# Patient Record
Sex: MALE | Race: WHITE | NOT HISPANIC OR LATINO | Employment: FULL TIME | URBAN - METROPOLITAN AREA
[De-identification: names, ages, dates, MRNs, and addresses within clinical notes are randomized per-mention and may not be internally consistent; named-entity substitution may affect disease eponyms.]

---

## 2019-10-10 ENCOUNTER — OFFICE VISIT (OUTPATIENT)
Dept: FAMILY MEDICINE CLINIC | Facility: CLINIC | Age: 29
End: 2019-10-10

## 2019-10-10 ENCOUNTER — LAB (OUTPATIENT)
Dept: LAB | Facility: HOSPITAL | Age: 29
End: 2019-10-10

## 2019-10-10 VITALS
HEART RATE: 83 BPM | DIASTOLIC BLOOD PRESSURE: 80 MMHG | WEIGHT: 220 LBS | OXYGEN SATURATION: 99 % | SYSTOLIC BLOOD PRESSURE: 120 MMHG | HEIGHT: 71 IN | BODY MASS INDEX: 30.8 KG/M2

## 2019-10-10 DIAGNOSIS — R10.31 RIGHT LOWER QUADRANT ABDOMINAL PAIN: Primary | ICD-10-CM

## 2019-10-10 DIAGNOSIS — R10.31 RIGHT LOWER QUADRANT ABDOMINAL PAIN: ICD-10-CM

## 2019-10-10 PROCEDURE — 99203 OFFICE O/P NEW LOW 30 MIN: CPT | Performed by: FAMILY MEDICINE

## 2019-10-10 PROCEDURE — 85025 COMPLETE CBC W/AUTO DIFF WBC: CPT

## 2019-10-10 PROCEDURE — 80053 COMPREHEN METABOLIC PANEL: CPT

## 2019-10-10 NOTE — PROGRESS NOTES
Subjective   Chetan Gandhi is a 29 y.o. male.     Chief Complaint   Patient presents with   • Establish Care     abdominal pain        History of Present Illness     Acute complaints:  Chetan Gandhi is a 29 y.o. male who presents today to establish care. Happened about a month ago, vomited once, went away. It came back about 2 weeks ago, lasted 4-5 days. No fever or chills or weight loss. No relation to food. Worsens at night after work, bothers him enough to toss and turn in bed. Saw urgent care. Has not had the pain again since that time. Bowel movements have been abnormal. Less solid than usual.    This patient is accompanied by their self who contributes to the history of their care.    The following portions of the patient's history were reviewed and updated as appropriate: allergies, current medications, past family history, past medical history, past social history, past surgical history and problem list.    Active Ambulatory Problems     Diagnosis Date Noted   • No Active Ambulatory Problems     Resolved Ambulatory Problems     Diagnosis Date Noted   • No Resolved Ambulatory Problems     No Additional Past Medical History     History reviewed. No pertinent surgical history.  Family History   Family history unknown: Yes     Social History     Socioeconomic History   • Marital status:      Spouse name: Not on file   • Number of children: Not on file   • Years of education: Not on file   • Highest education level: Not on file   Tobacco Use   • Smoking status: Never Smoker   • Smokeless tobacco: Never Used   Substance and Sexual Activity   • Alcohol use: No     Frequency: Never   • Drug use: No   • Sexual activity: Defer         Review of Systems   Constitutional: Negative.    Respiratory: Negative.    Cardiovascular: Negative.    Gastrointestinal: Positive for abdominal pain and diarrhea. Negative for abdominal distention, constipation, nausea, rectal pain and vomiting.       Objective  "  Blood pressure 120/80, pulse 83, height 180.3 cm (70.98\"), weight 99.8 kg (220 lb), SpO2 99 %.  Nursing note reviewed  Physical Exam  Const: NAD, A&Ox4, Pleasant, Cooperative  Eyes: EOMI, no conjunctivitis  ENT: No nasal discharge present, neck supple  Cardiac: Regular rate and rhythm, no cyanosis  Resp: Respiratory rate within normal limits, no increased work of breathing, no audible wheezing or retractions noted  GI: No distention or ascites. Mild TTP RLQ  Procedures  Assessment/Plan   Problem List Items Addressed This Visit     None      Visit Diagnoses     Right lower quadrant abdominal pain    -  Primary    Relevant Orders    CT Abdomen Pelvis With & Without Contrast    CBC & Differential    Comprehensive Metabolic Panel          We will plan to obtain previous records both for chronic preventative care as well as those related to the current episode of care.  Any records that the patient brought with him today were reviewed personally by me during the visit today and will be scanned into the chart for posterity.    There are no Patient Instructions on file for this visit.    No Follow-up on file.    Ambulatory progress note signed and attested to by Juwan Bryson D.O.           "

## 2019-10-11 LAB
ALBUMIN SERPL-MCNC: 4.7 G/DL (ref 3.5–5.2)
ALBUMIN/GLOB SERPL: 1.4 G/DL
ALP SERPL-CCNC: 89 U/L (ref 39–117)
ALT SERPL W P-5'-P-CCNC: 43 U/L (ref 1–41)
ANION GAP SERPL CALCULATED.3IONS-SCNC: 11.6 MMOL/L (ref 5–15)
AST SERPL-CCNC: 24 U/L (ref 1–40)
BASOPHILS # BLD AUTO: 0.07 10*3/MM3 (ref 0–0.2)
BASOPHILS NFR BLD AUTO: 1 % (ref 0–1.5)
BILIRUB SERPL-MCNC: 0.3 MG/DL (ref 0.2–1.2)
BUN BLD-MCNC: 13 MG/DL (ref 6–20)
BUN/CREAT SERPL: 9.8 (ref 7–25)
CALCIUM SPEC-SCNC: 9.7 MG/DL (ref 8.6–10.5)
CHLORIDE SERPL-SCNC: 99 MMOL/L (ref 98–107)
CO2 SERPL-SCNC: 27.4 MMOL/L (ref 22–29)
CREAT BLD-MCNC: 1.33 MG/DL (ref 0.76–1.27)
DEPRECATED RDW RBC AUTO: 36.5 FL (ref 37–54)
EOSINOPHIL # BLD AUTO: 0.46 10*3/MM3 (ref 0–0.4)
EOSINOPHIL NFR BLD AUTO: 6.4 % (ref 0.3–6.2)
ERYTHROCYTE [DISTWIDTH] IN BLOOD BY AUTOMATED COUNT: 13 % (ref 12.3–15.4)
GFR SERPL CREATININE-BSD FRML MDRD: 64 ML/MIN/1.73
GLOBULIN UR ELPH-MCNC: 3.4 GM/DL
GLUCOSE BLD-MCNC: 78 MG/DL (ref 65–99)
HCT VFR BLD AUTO: 43.8 % (ref 37.5–51)
HGB BLD-MCNC: 14.5 G/DL (ref 13–17.7)
IMM GRANULOCYTES # BLD AUTO: 0.09 10*3/MM3 (ref 0–0.05)
IMM GRANULOCYTES NFR BLD AUTO: 1.3 % (ref 0–0.5)
LYMPHOCYTES # BLD AUTO: 1.88 10*3/MM3 (ref 0.7–3.1)
LYMPHOCYTES NFR BLD AUTO: 26.3 % (ref 19.6–45.3)
MCH RBC QN AUTO: 26.5 PG (ref 26.6–33)
MCHC RBC AUTO-ENTMCNC: 33.1 G/DL (ref 31.5–35.7)
MCV RBC AUTO: 79.9 FL (ref 79–97)
MONOCYTES # BLD AUTO: 0.51 10*3/MM3 (ref 0.1–0.9)
MONOCYTES NFR BLD AUTO: 7.1 % (ref 5–12)
NEUTROPHILS # BLD AUTO: 4.13 10*3/MM3 (ref 1.7–7)
NEUTROPHILS NFR BLD AUTO: 57.9 % (ref 42.7–76)
NRBC BLD AUTO-RTO: 0 /100 WBC (ref 0–0.2)
PLATELET # BLD AUTO: 367 10*3/MM3 (ref 140–450)
PMV BLD AUTO: 11.8 FL (ref 6–12)
POTASSIUM BLD-SCNC: 4 MMOL/L (ref 3.5–5.2)
PROT SERPL-MCNC: 8.1 G/DL (ref 6–8.5)
RBC # BLD AUTO: 5.48 10*6/MM3 (ref 4.14–5.8)
SODIUM BLD-SCNC: 138 MMOL/L (ref 136–145)
WBC NRBC COR # BLD: 7.14 10*3/MM3 (ref 3.4–10.8)

## 2019-11-26 ENCOUNTER — HOSPITAL ENCOUNTER (INPATIENT)
Facility: HOSPITAL | Age: 29
LOS: 1 days | Discharge: HOME | End: 2019-11-28
Attending: EMERGENCY MEDICINE | Admitting: INTERNAL MEDICINE

## 2019-11-26 ENCOUNTER — APPOINTMENT (OUTPATIENT)
Dept: CT IMAGING | Facility: HOSPITAL | Age: 29
End: 2019-11-26

## 2019-11-26 DIAGNOSIS — N17.9 AKI (ACUTE KIDNEY INJURY) (HCC): ICD-10-CM

## 2019-11-26 DIAGNOSIS — R10.31 RIGHT LOWER QUADRANT ABDOMINAL PAIN: Primary | ICD-10-CM

## 2019-11-26 LAB
ALBUMIN SERPL-MCNC: 4.3 G/DL (ref 3.5–5.2)
ALBUMIN/GLOB SERPL: 1.1 G/DL
ALP SERPL-CCNC: 110 U/L (ref 39–117)
ALT SERPL W P-5'-P-CCNC: 32 U/L (ref 1–41)
ANION GAP SERPL CALCULATED.3IONS-SCNC: 14 MMOL/L (ref 5–15)
AST SERPL-CCNC: 18 U/L (ref 1–40)
BASOPHILS # BLD AUTO: 0.07 10*3/MM3 (ref 0–0.2)
BASOPHILS NFR BLD AUTO: 0.6 % (ref 0–1.5)
BILIRUB SERPL-MCNC: 0.6 MG/DL (ref 0.2–1.2)
BUN BLD-MCNC: 16 MG/DL (ref 6–20)
BUN/CREAT SERPL: 11.9 (ref 7–25)
CALCIUM SPEC-SCNC: 9.8 MG/DL (ref 8.6–10.5)
CHLORIDE SERPL-SCNC: 98 MMOL/L (ref 98–107)
CO2 SERPL-SCNC: 24 MMOL/L (ref 22–29)
CREAT BLD-MCNC: 1.35 MG/DL (ref 0.76–1.27)
D-LACTATE SERPL-SCNC: 0.6 MMOL/L (ref 0.5–2)
DEPRECATED RDW RBC AUTO: 40.4 FL (ref 37–54)
EOSINOPHIL # BLD AUTO: 0.14 10*3/MM3 (ref 0–0.4)
EOSINOPHIL NFR BLD AUTO: 1.2 % (ref 0.3–6.2)
ERYTHROCYTE [DISTWIDTH] IN BLOOD BY AUTOMATED COUNT: 13.2 % (ref 12.3–15.4)
GFR SERPL CREATININE-BSD FRML MDRD: 63 ML/MIN/1.73
GLOBULIN UR ELPH-MCNC: 3.9 GM/DL
GLUCOSE BLD-MCNC: 109 MG/DL (ref 65–99)
HCT VFR BLD AUTO: 42.4 % (ref 37.5–51)
HGB BLD-MCNC: 13.4 G/DL (ref 13–17.7)
HOLD SPECIMEN: NORMAL
HOLD SPECIMEN: NORMAL
IMM GRANULOCYTES # BLD AUTO: 0.11 10*3/MM3 (ref 0–0.05)
IMM GRANULOCYTES NFR BLD AUTO: 0.9 % (ref 0–0.5)
LIPASE SERPL-CCNC: 22 U/L (ref 13–60)
LYMPHOCYTES # BLD AUTO: 1.56 10*3/MM3 (ref 0.7–3.1)
LYMPHOCYTES NFR BLD AUTO: 12.9 % (ref 19.6–45.3)
MCH RBC QN AUTO: 26.4 PG (ref 26.6–33)
MCHC RBC AUTO-ENTMCNC: 31.6 G/DL (ref 31.5–35.7)
MCV RBC AUTO: 83.5 FL (ref 79–97)
MONOCYTES # BLD AUTO: 1.1 10*3/MM3 (ref 0.1–0.9)
MONOCYTES NFR BLD AUTO: 9.1 % (ref 5–12)
NEUTROPHILS # BLD AUTO: 9.13 10*3/MM3 (ref 1.7–7)
NEUTROPHILS NFR BLD AUTO: 75.3 % (ref 42.7–76)
NRBC BLD AUTO-RTO: 0 /100 WBC (ref 0–0.2)
PLATELET # BLD AUTO: 303 10*3/MM3 (ref 140–450)
PMV BLD AUTO: 11.3 FL (ref 6–12)
POTASSIUM BLD-SCNC: 4.2 MMOL/L (ref 3.5–5.2)
PROT SERPL-MCNC: 8.2 G/DL (ref 6–8.5)
RBC # BLD AUTO: 5.08 10*6/MM3 (ref 4.14–5.8)
SODIUM BLD-SCNC: 136 MMOL/L (ref 136–145)
WBC NRBC COR # BLD: 12.11 10*3/MM3 (ref 3.4–10.8)
WHOLE BLOOD HOLD SPECIMEN: NORMAL
WHOLE BLOOD HOLD SPECIMEN: NORMAL

## 2019-11-26 PROCEDURE — 74176 CT ABD & PELVIS W/O CONTRAST: CPT

## 2019-11-26 PROCEDURE — 85025 COMPLETE CBC W/AUTO DIFF WBC: CPT

## 2019-11-26 PROCEDURE — 99284 EMERGENCY DEPT VISIT MOD MDM: CPT

## 2019-11-26 PROCEDURE — 83605 ASSAY OF LACTIC ACID: CPT

## 2019-11-26 PROCEDURE — 83690 ASSAY OF LIPASE: CPT

## 2019-11-26 PROCEDURE — 80053 COMPREHEN METABOLIC PANEL: CPT

## 2019-11-26 RX ORDER — ONDANSETRON 2 MG/ML
4 INJECTION INTRAMUSCULAR; INTRAVENOUS ONCE
Status: COMPLETED | OUTPATIENT
Start: 2019-11-26 | End: 2019-11-27

## 2019-11-26 RX ORDER — SODIUM CHLORIDE 0.9 % (FLUSH) 0.9 %
10 SYRINGE (ML) INJECTION AS NEEDED
Status: DISCONTINUED | OUTPATIENT
Start: 2019-11-26 | End: 2019-11-28 | Stop reason: HOSPADM

## 2019-11-26 RX ORDER — MORPHINE SULFATE 4 MG/ML
4 INJECTION, SOLUTION INTRAMUSCULAR; INTRAVENOUS ONCE
Status: COMPLETED | OUTPATIENT
Start: 2019-11-26 | End: 2019-11-27

## 2019-11-27 PROBLEM — N28.9 RENAL INSUFFICIENCY: Status: ACTIVE | Noted: 2019-11-27

## 2019-11-27 PROBLEM — R10.31 RIGHT LOWER QUADRANT ABDOMINAL PAIN: Status: ACTIVE | Noted: 2019-11-27

## 2019-11-27 PROBLEM — K35.80 ACUTE APPENDICITIS: Status: ACTIVE | Noted: 2019-11-27

## 2019-11-27 PROBLEM — K36 CHRONIC APPENDICITIS: Status: ACTIVE | Noted: 2019-11-27

## 2019-11-27 PROBLEM — K35.32 RUPTURED APPENDICITIS: Status: ACTIVE | Noted: 2019-11-27

## 2019-11-27 LAB
ANION GAP SERPL CALCULATED.3IONS-SCNC: 11 MMOL/L (ref 5–15)
BASOPHILS # BLD AUTO: 0.06 10*3/MM3 (ref 0–0.2)
BASOPHILS NFR BLD AUTO: 0.5 % (ref 0–1.5)
BILIRUB UR QL STRIP: NEGATIVE
BUN BLD-MCNC: 15 MG/DL (ref 6–20)
BUN/CREAT SERPL: 10.1 (ref 7–25)
CALCIUM SPEC-SCNC: 9 MG/DL (ref 8.6–10.5)
CHLORIDE SERPL-SCNC: 100 MMOL/L (ref 98–107)
CLARITY UR: CLEAR
CO2 SERPL-SCNC: 26 MMOL/L (ref 22–29)
COLOR UR: YELLOW
CREAT BLD-MCNC: 1.48 MG/DL (ref 0.76–1.27)
CREAT UR-MCNC: 148.3 MG/DL
DEPRECATED RDW RBC AUTO: 41.1 FL (ref 37–54)
EOSINOPHIL # BLD AUTO: 0.13 10*3/MM3 (ref 0–0.4)
EOSINOPHIL NFR BLD AUTO: 1.2 % (ref 0.3–6.2)
ERYTHROCYTE [DISTWIDTH] IN BLOOD BY AUTOMATED COUNT: 13.4 % (ref 12.3–15.4)
FERRITIN SERPL-MCNC: 189.3 NG/ML (ref 30–400)
GFR SERPL CREATININE-BSD FRML MDRD: 57 ML/MIN/1.73
GLUCOSE BLD-MCNC: 109 MG/DL (ref 65–99)
GLUCOSE UR STRIP-MCNC: NEGATIVE MG/DL
HCT VFR BLD AUTO: 38.8 % (ref 37.5–51)
HGB BLD-MCNC: 12 G/DL (ref 13–17.7)
HGB UR QL STRIP.AUTO: NEGATIVE
IMM GRANULOCYTES # BLD AUTO: 0.07 10*3/MM3 (ref 0–0.05)
IMM GRANULOCYTES NFR BLD AUTO: 0.6 % (ref 0–0.5)
IRON 24H UR-MRATE: 25 MCG/DL (ref 59–158)
IRON SATN MFR SERPL: 10 % (ref 20–50)
KETONES UR QL STRIP: NEGATIVE
LEUKOCYTE ESTERASE UR QL STRIP.AUTO: NEGATIVE
LYMPHOCYTES # BLD AUTO: 1.99 10*3/MM3 (ref 0.7–3.1)
LYMPHOCYTES NFR BLD AUTO: 18.1 % (ref 19.6–45.3)
MCH RBC QN AUTO: 25.9 PG (ref 26.6–33)
MCHC RBC AUTO-ENTMCNC: 30.9 G/DL (ref 31.5–35.7)
MCV RBC AUTO: 83.8 FL (ref 79–97)
MONOCYTES # BLD AUTO: 1.1 10*3/MM3 (ref 0.1–0.9)
MONOCYTES NFR BLD AUTO: 10 % (ref 5–12)
NEUTROPHILS # BLD AUTO: 7.64 10*3/MM3 (ref 1.7–7)
NEUTROPHILS NFR BLD AUTO: 69.6 % (ref 42.7–76)
NITRITE UR QL STRIP: NEGATIVE
NRBC BLD AUTO-RTO: 0 /100 WBC (ref 0–0.2)
PH UR STRIP.AUTO: 7 [PH] (ref 5–8)
PLATELET # BLD AUTO: 264 10*3/MM3 (ref 140–450)
PMV BLD AUTO: 11.2 FL (ref 6–12)
POTASSIUM BLD-SCNC: 4.4 MMOL/L (ref 3.5–5.2)
PROT UR QL STRIP: NEGATIVE
RBC # BLD AUTO: 4.63 10*6/MM3 (ref 4.14–5.8)
SODIUM BLD-SCNC: 137 MMOL/L (ref 136–145)
SP GR UR STRIP: 1.01 (ref 1–1.03)
TIBC SERPL-MCNC: 259 MCG/DL (ref 298–536)
TRANSFERRIN SERPL-MCNC: 174 MG/DL (ref 200–360)
UROBILINOGEN UR QL STRIP: NORMAL
WBC NRBC COR # BLD: 10.99 10*3/MM3 (ref 3.4–10.8)

## 2019-11-27 PROCEDURE — 25010000002 ONDANSETRON PER 1 MG: Performed by: EMERGENCY MEDICINE

## 2019-11-27 PROCEDURE — 82728 ASSAY OF FERRITIN: CPT | Performed by: INTERNAL MEDICINE

## 2019-11-27 PROCEDURE — 82570 ASSAY OF URINE CREATININE: CPT | Performed by: NURSE PRACTITIONER

## 2019-11-27 PROCEDURE — 25010000002 MORPHINE PER 10 MG: Performed by: INTERNAL MEDICINE

## 2019-11-27 PROCEDURE — 80048 BASIC METABOLIC PNL TOTAL CA: CPT | Performed by: NURSE PRACTITIONER

## 2019-11-27 PROCEDURE — 81003 URINALYSIS AUTO W/O SCOPE: CPT | Performed by: NURSE PRACTITIONER

## 2019-11-27 PROCEDURE — 25010000002 PIPERACILLIN SOD-TAZOBACTAM PER 1 G: Performed by: NURSE PRACTITIONER

## 2019-11-27 PROCEDURE — 85025 COMPLETE CBC W/AUTO DIFF WBC: CPT | Performed by: NURSE PRACTITIONER

## 2019-11-27 PROCEDURE — 99223 1ST HOSP IP/OBS HIGH 75: CPT | Performed by: INTERNAL MEDICINE

## 2019-11-27 PROCEDURE — 25010000002 ERTAPENEM PER 500 MG: Performed by: INTERNAL MEDICINE

## 2019-11-27 PROCEDURE — 25010000002 PIPERACILLIN SOD-TAZOBACTAM PER 1 G: Performed by: INTERNAL MEDICINE

## 2019-11-27 PROCEDURE — 87040 BLOOD CULTURE FOR BACTERIA: CPT | Performed by: EMERGENCY MEDICINE

## 2019-11-27 PROCEDURE — 25010000002 MORPHINE PER 10 MG: Performed by: EMERGENCY MEDICINE

## 2019-11-27 PROCEDURE — 84466 ASSAY OF TRANSFERRIN: CPT | Performed by: INTERNAL MEDICINE

## 2019-11-27 PROCEDURE — 83540 ASSAY OF IRON: CPT | Performed by: INTERNAL MEDICINE

## 2019-11-27 RX ORDER — SODIUM CHLORIDE, SODIUM LACTATE, POTASSIUM CHLORIDE, CALCIUM CHLORIDE 600; 310; 30; 20 MG/100ML; MG/100ML; MG/100ML; MG/100ML
125 INJECTION, SOLUTION INTRAVENOUS CONTINUOUS
Status: ACTIVE | OUTPATIENT
Start: 2019-11-27 | End: 2019-11-28

## 2019-11-27 RX ORDER — MORPHINE SULFATE 4 MG/ML
4 INJECTION, SOLUTION INTRAMUSCULAR; INTRAVENOUS
Status: DISCONTINUED | OUTPATIENT
Start: 2019-11-27 | End: 2019-11-28 | Stop reason: HOSPADM

## 2019-11-27 RX ORDER — NALOXONE HCL 0.4 MG/ML
0.4 VIAL (ML) INJECTION
Status: DISCONTINUED | OUTPATIENT
Start: 2019-11-27 | End: 2019-11-28 | Stop reason: HOSPADM

## 2019-11-27 RX ORDER — ONDANSETRON 2 MG/ML
4 INJECTION INTRAMUSCULAR; INTRAVENOUS EVERY 6 HOURS PRN
Status: DISCONTINUED | OUTPATIENT
Start: 2019-11-27 | End: 2019-11-28 | Stop reason: HOSPADM

## 2019-11-27 RX ORDER — SODIUM CHLORIDE 0.9 % (FLUSH) 0.9 %
10 SYRINGE (ML) INJECTION EVERY 12 HOURS SCHEDULED
Status: DISCONTINUED | OUTPATIENT
Start: 2019-11-27 | End: 2019-11-28 | Stop reason: HOSPADM

## 2019-11-27 RX ORDER — HYDROCODONE BITARTRATE AND ACETAMINOPHEN 5; 325 MG/1; MG/1
1 TABLET ORAL EVERY 4 HOURS PRN
Status: DISCONTINUED | OUTPATIENT
Start: 2019-11-27 | End: 2019-11-28 | Stop reason: HOSPADM

## 2019-11-27 RX ORDER — ACETAMINOPHEN 325 MG/1
650 TABLET ORAL EVERY 6 HOURS PRN
Status: DISCONTINUED | OUTPATIENT
Start: 2019-11-27 | End: 2019-11-28 | Stop reason: HOSPADM

## 2019-11-27 RX ORDER — SODIUM CHLORIDE 0.9 % (FLUSH) 0.9 %
10 SYRINGE (ML) INJECTION AS NEEDED
Status: DISCONTINUED | OUTPATIENT
Start: 2019-11-27 | End: 2019-11-28 | Stop reason: HOSPADM

## 2019-11-27 RX ADMIN — SODIUM CHLORIDE, POTASSIUM CHLORIDE, SODIUM LACTATE AND CALCIUM CHLORIDE 125 ML/HR: 600; 310; 30; 20 INJECTION, SOLUTION INTRAVENOUS at 13:27

## 2019-11-27 RX ADMIN — SODIUM CHLORIDE, POTASSIUM CHLORIDE, SODIUM LACTATE AND CALCIUM CHLORIDE 125 ML/HR: 600; 310; 30; 20 INJECTION, SOLUTION INTRAVENOUS at 03:29

## 2019-11-27 RX ADMIN — HYDROCODONE BITARTRATE AND ACETAMINOPHEN 1 TABLET: 5; 325 TABLET ORAL at 19:49

## 2019-11-27 RX ADMIN — ACETAMINOPHEN 650 MG: 325 TABLET ORAL at 21:22

## 2019-11-27 RX ADMIN — ONDANSETRON 4 MG: 2 INJECTION INTRAMUSCULAR; INTRAVENOUS at 00:04

## 2019-11-27 RX ADMIN — ERTAPENEM SODIUM 1 G: 1 INJECTION, POWDER, LYOPHILIZED, FOR SOLUTION INTRAMUSCULAR; INTRAVENOUS at 13:10

## 2019-11-27 RX ADMIN — TAZOBACTAM SODIUM AND PIPERACILLIN SODIUM 3.38 G: 375; 3 INJECTION, SOLUTION INTRAVENOUS at 08:26

## 2019-11-27 RX ADMIN — SODIUM CHLORIDE, PRESERVATIVE FREE 10 ML: 5 INJECTION INTRAVENOUS at 21:23

## 2019-11-27 RX ADMIN — HYDROCODONE BITARTRATE AND ACETAMINOPHEN 1 TABLET: 5; 325 TABLET ORAL at 13:26

## 2019-11-27 RX ADMIN — MORPHINE SULFATE 4 MG: 4 INJECTION, SOLUTION INTRAMUSCULAR; INTRAVENOUS at 00:06

## 2019-11-27 RX ADMIN — TAZOBACTAM SODIUM AND PIPERACILLIN SODIUM 4.5 G: 500; 4 INJECTION, SOLUTION INTRAVENOUS at 01:50

## 2019-11-27 RX ADMIN — MORPHINE SULFATE 4 MG: 4 INJECTION, SOLUTION INTRAMUSCULAR; INTRAVENOUS at 05:01

## 2019-11-27 RX ADMIN — SODIUM CHLORIDE 1000 ML: 9 INJECTION, SOLUTION INTRAVENOUS at 00:04

## 2019-11-27 RX ADMIN — MORPHINE SULFATE 4 MG: 4 INJECTION, SOLUTION INTRAMUSCULAR; INTRAVENOUS at 08:26

## 2019-11-28 VITALS
DIASTOLIC BLOOD PRESSURE: 55 MMHG | TEMPERATURE: 98.9 F | HEART RATE: 81 BPM | RESPIRATION RATE: 16 BRPM | BODY MASS INDEX: 30.76 KG/M2 | WEIGHT: 219.7 LBS | SYSTOLIC BLOOD PRESSURE: 115 MMHG | OXYGEN SATURATION: 94 % | HEIGHT: 71 IN

## 2019-11-28 LAB
ANION GAP SERPL CALCULATED.3IONS-SCNC: 11 MMOL/L (ref 5–15)
BUN BLD-MCNC: 10 MG/DL (ref 6–20)
BUN/CREAT SERPL: 9.6 (ref 7–25)
CALCIUM SPEC-SCNC: 8.9 MG/DL (ref 8.6–10.5)
CHLORIDE SERPL-SCNC: 100 MMOL/L (ref 98–107)
CO2 SERPL-SCNC: 23 MMOL/L (ref 22–29)
CREAT BLD-MCNC: 1.04 MG/DL (ref 0.76–1.27)
GFR SERPL CREATININE-BSD FRML MDRD: 85 ML/MIN/1.73
GLUCOSE BLD-MCNC: 105 MG/DL (ref 65–99)
POTASSIUM BLD-SCNC: 4.2 MMOL/L (ref 3.5–5.2)
SODIUM BLD-SCNC: 134 MMOL/L (ref 136–145)

## 2019-11-28 PROCEDURE — 25010000002 ERTAPENEM PER 500 MG: Performed by: INTERNAL MEDICINE

## 2019-11-28 PROCEDURE — 99239 HOSP IP/OBS DSCHRG MGMT >30: CPT | Performed by: INTERNAL MEDICINE

## 2019-11-28 PROCEDURE — 80048 BASIC METABOLIC PNL TOTAL CA: CPT | Performed by: INTERNAL MEDICINE

## 2019-11-28 RX ORDER — HYDROCODONE BITARTRATE AND ACETAMINOPHEN 5; 325 MG/1; MG/1
1 TABLET ORAL EVERY 4 HOURS PRN
Qty: 20 TABLET | Refills: 0 | Status: SHIPPED | OUTPATIENT
Start: 2019-11-28 | End: 2019-12-07

## 2019-11-28 RX ORDER — ONDANSETRON 4 MG/1
4 TABLET, FILM COATED ORAL EVERY 8 HOURS PRN
Qty: 20 TABLET | Refills: 0 | Status: SHIPPED | OUTPATIENT
Start: 2019-11-28 | End: 2020-08-11

## 2019-11-28 RX ORDER — DOCUSATE SODIUM 100 MG/1
100 CAPSULE, LIQUID FILLED ORAL 2 TIMES DAILY
Qty: 30 CAPSULE | Refills: 0 | Status: SHIPPED | OUTPATIENT
Start: 2019-11-28 | End: 2020-08-11

## 2019-11-28 RX ADMIN — HYDROCODONE BITARTRATE AND ACETAMINOPHEN 1 TABLET: 5; 325 TABLET ORAL at 10:52

## 2019-11-28 RX ADMIN — ERTAPENEM SODIUM 1 G: 1 INJECTION, POWDER, LYOPHILIZED, FOR SOLUTION INTRAMUSCULAR; INTRAVENOUS at 08:52

## 2019-11-28 RX ADMIN — HYDROCODONE BITARTRATE AND ACETAMINOPHEN 1 TABLET: 5; 325 TABLET ORAL at 06:34

## 2019-12-02 ENCOUNTER — LAB (OUTPATIENT)
Dept: LAB | Facility: HOSPITAL | Age: 29
End: 2019-12-02

## 2019-12-02 ENCOUNTER — TRANSITIONAL CARE MANAGEMENT TELEPHONE ENCOUNTER (OUTPATIENT)
Dept: INTERNAL MEDICINE | Facility: CLINIC | Age: 29
End: 2019-12-02

## 2019-12-02 ENCOUNTER — TRANSCRIBE ORDERS (OUTPATIENT)
Dept: LAB | Facility: HOSPITAL | Age: 29
End: 2019-12-02

## 2019-12-02 DIAGNOSIS — K35.32 RUPTURE OF APPENDIX: Primary | ICD-10-CM

## 2019-12-02 DIAGNOSIS — K35.32 RUPTURE OF APPENDIX: ICD-10-CM

## 2019-12-02 LAB
ALBUMIN SERPL-MCNC: 4.1 G/DL (ref 3.5–5.2)
ALBUMIN/GLOB SERPL: 0.9 G/DL
ALP SERPL-CCNC: 102 U/L (ref 39–117)
ALT SERPL W P-5'-P-CCNC: 36 U/L (ref 1–41)
ANION GAP SERPL CALCULATED.3IONS-SCNC: 11 MMOL/L (ref 5–15)
AST SERPL-CCNC: 34 U/L (ref 1–40)
BACTERIA SPEC AEROBE CULT: NORMAL
BACTERIA SPEC AEROBE CULT: NORMAL
BASOPHILS # BLD AUTO: 0.05 10*3/MM3 (ref 0–0.2)
BASOPHILS NFR BLD AUTO: 0.6 % (ref 0–1.5)
BILIRUB SERPL-MCNC: 0.3 MG/DL (ref 0.2–1.2)
BUN BLD-MCNC: 15 MG/DL (ref 6–20)
BUN/CREAT SERPL: 12 (ref 7–25)
CALCIUM SPEC-SCNC: 10.1 MG/DL (ref 8.6–10.5)
CHLORIDE SERPL-SCNC: 98 MMOL/L (ref 98–107)
CO2 SERPL-SCNC: 27 MMOL/L (ref 22–29)
CREAT BLD-MCNC: 1.25 MG/DL (ref 0.76–1.27)
CRP SERPL-MCNC: 7.09 MG/DL (ref 0–0.5)
DEPRECATED RDW RBC AUTO: 39.1 FL (ref 37–54)
EOSINOPHIL # BLD AUTO: 0.28 10*3/MM3 (ref 0–0.4)
EOSINOPHIL NFR BLD AUTO: 3.3 % (ref 0.3–6.2)
ERYTHROCYTE [DISTWIDTH] IN BLOOD BY AUTOMATED COUNT: 13 % (ref 12.3–15.4)
ERYTHROCYTE [SEDIMENTATION RATE] IN BLOOD: 81 MM/HR (ref 0–15)
GFR SERPL CREATININE-BSD FRML MDRD: 69 ML/MIN/1.73
GLOBULIN UR ELPH-MCNC: 4.6 GM/DL
GLUCOSE BLD-MCNC: 107 MG/DL (ref 65–99)
HCT VFR BLD AUTO: 45.2 % (ref 37.5–51)
HGB BLD-MCNC: 14 G/DL (ref 13–17.7)
IMM GRANULOCYTES # BLD AUTO: 0.03 10*3/MM3 (ref 0–0.05)
IMM GRANULOCYTES NFR BLD AUTO: 0.4 % (ref 0–0.5)
LYMPHOCYTES # BLD AUTO: 1.3 10*3/MM3 (ref 0.7–3.1)
LYMPHOCYTES NFR BLD AUTO: 15.5 % (ref 19.6–45.3)
MCH RBC QN AUTO: 25.8 PG (ref 26.6–33)
MCHC RBC AUTO-ENTMCNC: 31 G/DL (ref 31.5–35.7)
MCV RBC AUTO: 83.4 FL (ref 79–97)
MONOCYTES # BLD AUTO: 0.5 10*3/MM3 (ref 0.1–0.9)
MONOCYTES NFR BLD AUTO: 6 % (ref 5–12)
NEUTROPHILS # BLD AUTO: 6.23 10*3/MM3 (ref 1.7–7)
NEUTROPHILS NFR BLD AUTO: 74.2 % (ref 42.7–76)
NRBC BLD AUTO-RTO: 0 /100 WBC (ref 0–0.2)
PLATELET # BLD AUTO: 373 10*3/MM3 (ref 140–450)
PMV BLD AUTO: 10.8 FL (ref 6–12)
POTASSIUM BLD-SCNC: 4.7 MMOL/L (ref 3.5–5.2)
PROT SERPL-MCNC: 8.7 G/DL (ref 6–8.5)
RBC # BLD AUTO: 5.42 10*6/MM3 (ref 4.14–5.8)
SODIUM BLD-SCNC: 136 MMOL/L (ref 136–145)
WBC NRBC COR # BLD: 8.39 10*3/MM3 (ref 3.4–10.8)

## 2019-12-02 PROCEDURE — 80053 COMPREHEN METABOLIC PANEL: CPT

## 2019-12-02 PROCEDURE — 36415 COLL VENOUS BLD VENIPUNCTURE: CPT

## 2019-12-02 PROCEDURE — 85652 RBC SED RATE AUTOMATED: CPT

## 2019-12-02 PROCEDURE — 85025 COMPLETE CBC W/AUTO DIFF WBC: CPT

## 2019-12-02 PROCEDURE — 86140 C-REACTIVE PROTEIN: CPT

## 2019-12-02 NOTE — OUTREACH NOTE
"TCM call completed.  See TCM flowsheet for details.  Does NOT have upcoming hospital follow up with PCP.  Is up and about and states was needing pain medicine until today and pain is \"good today.\"  Has not required any norco today.  Is getting IV infusions and will see Dr. Mai (ID) tomorrow.  Eating and drinking.  States stools are loose.  Discussed backing off on docusate, but he will discuss tomorrow.  Denies fever, chills, SOB, lightheadedness/dizziness, rapid heart rate/palpitations, or chest pain.  He would like to wait for appointment with Dr. Bryson until after all his other appointments and to see what exactly he needs to have done.  Denies any needs at present and appreciated the call.     "

## 2019-12-05 ENCOUNTER — TRANSCRIBE ORDERS (OUTPATIENT)
Dept: ADMINISTRATIVE | Facility: HOSPITAL | Age: 29
End: 2019-12-05

## 2019-12-05 DIAGNOSIS — K35.32 APPENDICITIS WITH PERFORATION: Primary | ICD-10-CM

## 2019-12-13 ENCOUNTER — HOSPITAL ENCOUNTER (OUTPATIENT)
Dept: CT IMAGING | Facility: HOSPITAL | Age: 29
Discharge: HOME | End: 2019-12-13
Admitting: INTERNAL MEDICINE

## 2019-12-13 DIAGNOSIS — K35.32 APPENDICITIS WITH PERFORATION: ICD-10-CM

## 2019-12-13 PROCEDURE — 25010000002 IOPAMIDOL 61 % SOLUTION: Performed by: INTERNAL MEDICINE

## 2019-12-13 PROCEDURE — 0 DIATRIZOATE MEGLUMINE & SODIUM PER 1 ML: Performed by: INTERNAL MEDICINE

## 2019-12-13 PROCEDURE — 74177 CT ABD & PELVIS W/CONTRAST: CPT

## 2019-12-13 RX ADMIN — DIATRIZOATE MEGLUMINE AND DIATRIZOATE SODIUM 15 ML: 660; 100 LIQUID ORAL; RECTAL at 11:53

## 2019-12-13 RX ADMIN — IOPAMIDOL 85 ML: 612 INJECTION, SOLUTION INTRAVENOUS at 11:53

## 2019-12-17 ENCOUNTER — TRANSCRIBE ORDERS (OUTPATIENT)
Dept: LAB | Facility: HOSPITAL | Age: 29
End: 2019-12-17

## 2019-12-17 ENCOUNTER — LAB (OUTPATIENT)
Dept: LAB | Facility: HOSPITAL | Age: 29
End: 2019-12-17

## 2019-12-17 DIAGNOSIS — K35.32 RUPTURE OF APPENDIX: Primary | ICD-10-CM

## 2019-12-17 DIAGNOSIS — D72.823 NEUTROPHILIC LEUKEMOID REACTION: ICD-10-CM

## 2019-12-17 DIAGNOSIS — K35.32 RUPTURE OF APPENDIX: ICD-10-CM

## 2019-12-17 DIAGNOSIS — N17.8 ACUTE RENAL FAILURE WITH PATHOLOGICAL LESION IN KIDNEY (HCC): ICD-10-CM

## 2019-12-17 LAB
ALBUMIN SERPL-MCNC: 4.3 G/DL (ref 3.5–5.2)
ALBUMIN/GLOB SERPL: 1.3 G/DL
ALP SERPL-CCNC: 80 U/L (ref 39–117)
ALT SERPL W P-5'-P-CCNC: 65 U/L (ref 1–41)
ANION GAP SERPL CALCULATED.3IONS-SCNC: 11 MMOL/L (ref 5–15)
AST SERPL-CCNC: 32 U/L (ref 1–40)
BILIRUB SERPL-MCNC: <0.2 MG/DL (ref 0.2–1.2)
BUN BLD-MCNC: 16 MG/DL (ref 6–20)
BUN/CREAT SERPL: 15.1 (ref 7–25)
CALCIUM SPEC-SCNC: 9.8 MG/DL (ref 8.6–10.5)
CHLORIDE SERPL-SCNC: 102 MMOL/L (ref 98–107)
CO2 SERPL-SCNC: 25 MMOL/L (ref 22–29)
CREAT BLD-MCNC: 1.06 MG/DL (ref 0.76–1.27)
CRP SERPL-MCNC: 0.11 MG/DL (ref 0–0.5)
DEPRECATED RDW RBC AUTO: 41.7 FL (ref 37–54)
ERYTHROCYTE [DISTWIDTH] IN BLOOD BY AUTOMATED COUNT: 13.6 % (ref 12.3–15.4)
ERYTHROCYTE [SEDIMENTATION RATE] IN BLOOD: 27 MM/HR (ref 0–15)
GFR SERPL CREATININE-BSD FRML MDRD: 83 ML/MIN/1.73
GLOBULIN UR ELPH-MCNC: 3.3 GM/DL
GLUCOSE BLD-MCNC: 97 MG/DL (ref 65–99)
HCT VFR BLD AUTO: 45.5 % (ref 37.5–51)
HGB BLD-MCNC: 14.2 G/DL (ref 13–17.7)
MCH RBC QN AUTO: 26.2 PG (ref 26.6–33)
MCHC RBC AUTO-ENTMCNC: 31.2 G/DL (ref 31.5–35.7)
MCV RBC AUTO: 83.9 FL (ref 79–97)
PLATELET # BLD AUTO: 325 10*3/MM3 (ref 140–450)
PMV BLD AUTO: 11.5 FL (ref 6–12)
POTASSIUM BLD-SCNC: 4.4 MMOL/L (ref 3.5–5.2)
PROT SERPL-MCNC: 7.6 G/DL (ref 6–8.5)
RBC # BLD AUTO: 5.42 10*6/MM3 (ref 4.14–5.8)
SODIUM BLD-SCNC: 138 MMOL/L (ref 136–145)
WBC NRBC COR # BLD: 6.88 10*3/MM3 (ref 3.4–10.8)

## 2019-12-17 PROCEDURE — 86140 C-REACTIVE PROTEIN: CPT

## 2019-12-17 PROCEDURE — 85027 COMPLETE CBC AUTOMATED: CPT

## 2019-12-17 PROCEDURE — 80053 COMPREHEN METABOLIC PANEL: CPT

## 2019-12-17 PROCEDURE — 85652 RBC SED RATE AUTOMATED: CPT

## 2019-12-17 PROCEDURE — 36415 COLL VENOUS BLD VENIPUNCTURE: CPT

## 2020-08-11 ENCOUNTER — APPOINTMENT (OUTPATIENT)
Dept: CT IMAGING | Facility: HOSPITAL | Age: 30
End: 2020-08-11

## 2020-08-11 ENCOUNTER — HOSPITAL ENCOUNTER (INPATIENT)
Facility: HOSPITAL | Age: 30
LOS: 2 days | Discharge: HOME | End: 2020-08-13
Attending: EMERGENCY MEDICINE | Admitting: SURGERY

## 2020-08-11 ENCOUNTER — ANESTHESIA (OUTPATIENT)
Dept: PERIOP | Facility: HOSPITAL | Age: 30
End: 2020-08-11

## 2020-08-11 ENCOUNTER — ANESTHESIA EVENT (OUTPATIENT)
Dept: PERIOP | Facility: HOSPITAL | Age: 30
End: 2020-08-11

## 2020-08-11 DIAGNOSIS — R10.9 ACUTE ABDOMINAL PAIN: Primary | ICD-10-CM

## 2020-08-11 DIAGNOSIS — K35.80 ACUTE APPENDICITIS: ICD-10-CM

## 2020-08-11 DIAGNOSIS — K35.30 ACUTE APPENDICITIS WITH LOCALIZED PERITONITIS WITHOUT ABSCESS, UNSPECIFIED WHETHER GANGRENE PRESENT, UNSPECIFIED WHETHER PERFORATION PRESENT: ICD-10-CM

## 2020-08-11 LAB
ALBUMIN SERPL-MCNC: 4.7 G/DL (ref 3.5–5.2)
ALBUMIN/GLOB SERPL: 1.4 G/DL
ALP SERPL-CCNC: 97 U/L (ref 39–117)
ALT SERPL W P-5'-P-CCNC: 29 U/L (ref 1–41)
ANION GAP SERPL CALCULATED.3IONS-SCNC: 13 MMOL/L (ref 5–15)
AST SERPL-CCNC: 21 U/L (ref 1–40)
BASOPHILS # BLD AUTO: 0.07 10*3/MM3 (ref 0–0.2)
BASOPHILS NFR BLD AUTO: 0.7 % (ref 0–1.5)
BILIRUB SERPL-MCNC: 0.5 MG/DL (ref 0–1.2)
BUN SERPL-MCNC: 12 MG/DL (ref 6–20)
BUN/CREAT SERPL: 8 (ref 7–25)
CALCIUM SPEC-SCNC: 10 MG/DL (ref 8.6–10.5)
CHLORIDE SERPL-SCNC: 101 MMOL/L (ref 98–107)
CO2 SERPL-SCNC: 25 MMOL/L (ref 22–29)
CREAT SERPL-MCNC: 1.5 MG/DL (ref 0.76–1.27)
DEPRECATED RDW RBC AUTO: 40.2 FL (ref 37–54)
EOSINOPHIL # BLD AUTO: 0.33 10*3/MM3 (ref 0–0.4)
EOSINOPHIL NFR BLD AUTO: 3.1 % (ref 0.3–6.2)
ERYTHROCYTE [DISTWIDTH] IN BLOOD BY AUTOMATED COUNT: 12.9 % (ref 12.3–15.4)
GFR SERPL CREATININE-BSD FRML MDRD: 55 ML/MIN/1.73
GLOBULIN UR ELPH-MCNC: 3.3 GM/DL
GLUCOSE SERPL-MCNC: 98 MG/DL (ref 65–99)
HCT VFR BLD AUTO: 47 % (ref 37.5–51)
HGB BLD-MCNC: 14.8 G/DL (ref 13–17.7)
HOLD SPECIMEN: NORMAL
HOLD SPECIMEN: NORMAL
IMM GRANULOCYTES # BLD AUTO: 0.05 10*3/MM3 (ref 0–0.05)
IMM GRANULOCYTES NFR BLD AUTO: 0.5 % (ref 0–0.5)
LIPASE SERPL-CCNC: 32 U/L (ref 13–60)
LYMPHOCYTES # BLD AUTO: 2.2 10*3/MM3 (ref 0.7–3.1)
LYMPHOCYTES NFR BLD AUTO: 20.5 % (ref 19.6–45.3)
MCH RBC QN AUTO: 26.8 PG (ref 26.6–33)
MCHC RBC AUTO-ENTMCNC: 31.5 G/DL (ref 31.5–35.7)
MCV RBC AUTO: 85 FL (ref 79–97)
MONOCYTES # BLD AUTO: 0.83 10*3/MM3 (ref 0.1–0.9)
MONOCYTES NFR BLD AUTO: 7.7 % (ref 5–12)
NEUTROPHILS NFR BLD AUTO: 67.5 % (ref 42.7–76)
NEUTROPHILS NFR BLD AUTO: 7.23 10*3/MM3 (ref 1.7–7)
NRBC BLD AUTO-RTO: 0 /100 WBC (ref 0–0.2)
PLATELET # BLD AUTO: 276 10*3/MM3 (ref 140–450)
PMV BLD AUTO: 11.7 FL (ref 6–12)
POTASSIUM SERPL-SCNC: 4.4 MMOL/L (ref 3.5–5.2)
PROT SERPL-MCNC: 8 G/DL (ref 6–8.5)
RBC # BLD AUTO: 5.53 10*6/MM3 (ref 4.14–5.8)
SARS-COV-2 RDRP RESP QL NAA+PROBE: NOT DETECTED
SODIUM SERPL-SCNC: 139 MMOL/L (ref 136–145)
WBC # BLD AUTO: 10.71 10*3/MM3 (ref 3.4–10.8)
WHOLE BLOOD HOLD SPECIMEN: NORMAL
WHOLE BLOOD HOLD SPECIMEN: NORMAL

## 2020-08-11 PROCEDURE — 80053 COMPREHEN METABOLIC PANEL: CPT | Performed by: EMERGENCY MEDICINE

## 2020-08-11 PROCEDURE — 25010000002 HYDROMORPHONE 1 MG/ML SOLUTION: Performed by: SURGERY

## 2020-08-11 PROCEDURE — 25010000002 PIPERACILLIN SOD-TAZOBACTAM PER 1 G: Performed by: EMERGENCY MEDICINE

## 2020-08-11 PROCEDURE — 25010000002 ONDANSETRON PER 1 MG: Performed by: SURGERY

## 2020-08-11 PROCEDURE — 25010000002 FENTANYL CITRATE (PF) 100 MCG/2ML SOLUTION: Performed by: NURSE ANESTHETIST, CERTIFIED REGISTERED

## 2020-08-11 PROCEDURE — 25010000002 IOPAMIDOL 61 % SOLUTION: Performed by: EMERGENCY MEDICINE

## 2020-08-11 PROCEDURE — 88304 TISSUE EXAM BY PATHOLOGIST: CPT | Performed by: SURGERY

## 2020-08-11 PROCEDURE — 25010000002 NEOSTIGMINE 10 MG/10ML SOLUTION: Performed by: NURSE ANESTHETIST, CERTIFIED REGISTERED

## 2020-08-11 PROCEDURE — 83690 ASSAY OF LIPASE: CPT | Performed by: EMERGENCY MEDICINE

## 2020-08-11 PROCEDURE — 0 DIATRIZOATE MEGLUMINE & SODIUM PER 1 ML: Performed by: EMERGENCY MEDICINE

## 2020-08-11 PROCEDURE — 25010000002 DEXAMETHASONE PER 1 MG: Performed by: NURSE ANESTHETIST, CERTIFIED REGISTERED

## 2020-08-11 PROCEDURE — 85025 COMPLETE CBC W/AUTO DIFF WBC: CPT | Performed by: EMERGENCY MEDICINE

## 2020-08-11 PROCEDURE — 99284 EMERGENCY DEPT VISIT MOD MDM: CPT

## 2020-08-11 PROCEDURE — 25010000002 ONDANSETRON PER 1 MG: Performed by: NURSE ANESTHETIST, CERTIFIED REGISTERED

## 2020-08-11 PROCEDURE — 25010000002 MIDAZOLAM PER 1 MG: Performed by: NURSE ANESTHETIST, CERTIFIED REGISTERED

## 2020-08-11 PROCEDURE — 25010000002 PROPOFOL 10 MG/ML EMULSION: Performed by: NURSE ANESTHETIST, CERTIFIED REGISTERED

## 2020-08-11 PROCEDURE — 74177 CT ABD & PELVIS W/CONTRAST: CPT

## 2020-08-11 PROCEDURE — 25010000002 HYDROMORPHONE PER 4 MG: Performed by: EMERGENCY MEDICINE

## 2020-08-11 PROCEDURE — 87635 SARS-COV-2 COVID-19 AMP PRB: CPT | Performed by: EMERGENCY MEDICINE

## 2020-08-11 PROCEDURE — 0DTJ4ZZ RESECTION OF APPENDIX, PERCUTANEOUS ENDOSCOPIC APPROACH: ICD-10-PCS | Performed by: SURGERY

## 2020-08-11 PROCEDURE — 25010000003 LIDOCAINE 1 % SOLUTION: Performed by: NURSE ANESTHETIST, CERTIFIED REGISTERED

## 2020-08-11 PROCEDURE — 25010000002 PIPERACILLIN SOD-TAZOBACTAM PER 1 G: Performed by: SURGERY

## 2020-08-11 PROCEDURE — 25010000002 ONDANSETRON PER 1 MG: Performed by: EMERGENCY MEDICINE

## 2020-08-11 RX ORDER — HYDROMORPHONE HYDROCHLORIDE 1 MG/ML
0.5 INJECTION, SOLUTION INTRAMUSCULAR; INTRAVENOUS; SUBCUTANEOUS ONCE
Status: COMPLETED | OUTPATIENT
Start: 2020-08-11 | End: 2020-08-11

## 2020-08-11 RX ORDER — SODIUM CHLORIDE 0.9 % (FLUSH) 0.9 %
10 SYRINGE (ML) INJECTION AS NEEDED
Status: DISCONTINUED | OUTPATIENT
Start: 2020-08-11 | End: 2020-08-13 | Stop reason: HOSPADM

## 2020-08-11 RX ORDER — LIDOCAINE HYDROCHLORIDE 10 MG/ML
INJECTION, SOLUTION INFILTRATION; PERINEURAL AS NEEDED
Status: DISCONTINUED | OUTPATIENT
Start: 2020-08-11 | End: 2020-08-11 | Stop reason: SURG

## 2020-08-11 RX ORDER — SODIUM CHLORIDE, SODIUM LACTATE, POTASSIUM CHLORIDE, CALCIUM CHLORIDE 600; 310; 30; 20 MG/100ML; MG/100ML; MG/100ML; MG/100ML
9 INJECTION, SOLUTION INTRAVENOUS CONTINUOUS
Status: DISCONTINUED | OUTPATIENT
Start: 2020-08-11 | End: 2020-08-13 | Stop reason: HOSPADM

## 2020-08-11 RX ORDER — HYDROCODONE BITARTRATE AND ACETAMINOPHEN 7.5; 325 MG/1; MG/1
2 TABLET ORAL EVERY 4 HOURS PRN
Status: DISCONTINUED | OUTPATIENT
Start: 2020-08-11 | End: 2020-08-11 | Stop reason: HOSPADM

## 2020-08-11 RX ORDER — PROMETHAZINE HYDROCHLORIDE 25 MG/ML
6.25 INJECTION, SOLUTION INTRAMUSCULAR; INTRAVENOUS ONCE AS NEEDED
Status: DISCONTINUED | OUTPATIENT
Start: 2020-08-11 | End: 2020-08-11 | Stop reason: HOSPADM

## 2020-08-11 RX ORDER — LIDOCAINE HYDROCHLORIDE 10 MG/ML
0.5 INJECTION, SOLUTION EPIDURAL; INFILTRATION; INTRACAUDAL; PERINEURAL ONCE AS NEEDED
Status: DISCONTINUED | OUTPATIENT
Start: 2020-08-11 | End: 2020-08-11 | Stop reason: HOSPADM

## 2020-08-11 RX ORDER — ONDANSETRON 4 MG/1
4 TABLET, FILM COATED ORAL EVERY 6 HOURS PRN
Status: DISCONTINUED | OUTPATIENT
Start: 2020-08-11 | End: 2020-08-13 | Stop reason: HOSPADM

## 2020-08-11 RX ORDER — SODIUM CHLORIDE 9 MG/ML
100 INJECTION, SOLUTION INTRAVENOUS CONTINUOUS
Status: DISCONTINUED | OUTPATIENT
Start: 2020-08-11 | End: 2020-08-13

## 2020-08-11 RX ORDER — MAGNESIUM HYDROXIDE 1200 MG/15ML
LIQUID ORAL AS NEEDED
Status: DISCONTINUED | OUTPATIENT
Start: 2020-08-11 | End: 2020-08-11 | Stop reason: HOSPADM

## 2020-08-11 RX ORDER — ACETAMINOPHEN 325 MG/1
650 TABLET ORAL EVERY 4 HOURS PRN
Status: DISCONTINUED | OUTPATIENT
Start: 2020-08-11 | End: 2020-08-13 | Stop reason: HOSPADM

## 2020-08-11 RX ORDER — BUPIVACAINE HYDROCHLORIDE AND EPINEPHRINE 5; 5 MG/ML; UG/ML
INJECTION, SOLUTION PERINEURAL AS NEEDED
Status: DISCONTINUED | OUTPATIENT
Start: 2020-08-11 | End: 2020-08-11 | Stop reason: HOSPADM

## 2020-08-11 RX ORDER — ROCURONIUM BROMIDE 10 MG/ML
INJECTION, SOLUTION INTRAVENOUS AS NEEDED
Status: DISCONTINUED | OUTPATIENT
Start: 2020-08-11 | End: 2020-08-11 | Stop reason: SURG

## 2020-08-11 RX ORDER — SODIUM CHLORIDE 9 MG/ML
INJECTION, SOLUTION INTRAVENOUS AS NEEDED
Status: DISCONTINUED | OUTPATIENT
Start: 2020-08-11 | End: 2020-08-11 | Stop reason: HOSPADM

## 2020-08-11 RX ORDER — ONDANSETRON 2 MG/ML
4 INJECTION INTRAMUSCULAR; INTRAVENOUS ONCE AS NEEDED
Status: DISCONTINUED | OUTPATIENT
Start: 2020-08-11 | End: 2020-08-11 | Stop reason: HOSPADM

## 2020-08-11 RX ORDER — IBUPROFEN 400 MG/1
400 TABLET ORAL
Status: DISCONTINUED | OUTPATIENT
Start: 2020-08-11 | End: 2020-08-13 | Stop reason: HOSPADM

## 2020-08-11 RX ORDER — ONDANSETRON 2 MG/ML
4 INJECTION INTRAMUSCULAR; INTRAVENOUS ONCE
Status: COMPLETED | OUTPATIENT
Start: 2020-08-11 | End: 2020-08-11

## 2020-08-11 RX ORDER — SODIUM CHLORIDE 0.9 % (FLUSH) 0.9 %
10 SYRINGE (ML) INJECTION AS NEEDED
Status: DISCONTINUED | OUTPATIENT
Start: 2020-08-11 | End: 2020-08-11 | Stop reason: HOSPADM

## 2020-08-11 RX ORDER — SODIUM CHLORIDE 0.9 % (FLUSH) 0.9 %
10 SYRINGE (ML) INJECTION EVERY 12 HOURS SCHEDULED
Status: DISCONTINUED | OUTPATIENT
Start: 2020-08-11 | End: 2020-08-11 | Stop reason: HOSPADM

## 2020-08-11 RX ORDER — PROPOFOL 10 MG/ML
VIAL (ML) INTRAVENOUS AS NEEDED
Status: DISCONTINUED | OUTPATIENT
Start: 2020-08-11 | End: 2020-08-11 | Stop reason: SURG

## 2020-08-11 RX ORDER — HYDROMORPHONE HYDROCHLORIDE 1 MG/ML
0.5 INJECTION, SOLUTION INTRAMUSCULAR; INTRAVENOUS; SUBCUTANEOUS
Status: DISCONTINUED | OUTPATIENT
Start: 2020-08-11 | End: 2020-08-11 | Stop reason: HOSPADM

## 2020-08-11 RX ORDER — FAMOTIDINE 10 MG/ML
20 INJECTION, SOLUTION INTRAVENOUS ONCE
Status: COMPLETED | OUTPATIENT
Start: 2020-08-11 | End: 2020-08-11

## 2020-08-11 RX ORDER — PROMETHAZINE HYDROCHLORIDE 25 MG/1
25 TABLET ORAL ONCE AS NEEDED
Status: DISCONTINUED | OUTPATIENT
Start: 2020-08-11 | End: 2020-08-11 | Stop reason: HOSPADM

## 2020-08-11 RX ORDER — FAMOTIDINE 20 MG/1
20 TABLET, FILM COATED ORAL 2 TIMES DAILY
Status: DISCONTINUED | OUTPATIENT
Start: 2020-08-11 | End: 2020-08-13 | Stop reason: HOSPADM

## 2020-08-11 RX ORDER — FENTANYL CITRATE 50 UG/ML
INJECTION, SOLUTION INTRAMUSCULAR; INTRAVENOUS AS NEEDED
Status: DISCONTINUED | OUTPATIENT
Start: 2020-08-11 | End: 2020-08-11 | Stop reason: SURG

## 2020-08-11 RX ORDER — DEXAMETHASONE SODIUM PHOSPHATE 4 MG/ML
INJECTION, SOLUTION INTRA-ARTICULAR; INTRALESIONAL; INTRAMUSCULAR; INTRAVENOUS; SOFT TISSUE AS NEEDED
Status: DISCONTINUED | OUTPATIENT
Start: 2020-08-11 | End: 2020-08-11 | Stop reason: SURG

## 2020-08-11 RX ORDER — PROMETHAZINE HYDROCHLORIDE 25 MG/1
25 SUPPOSITORY RECTAL ONCE AS NEEDED
Status: DISCONTINUED | OUTPATIENT
Start: 2020-08-11 | End: 2020-08-11 | Stop reason: HOSPADM

## 2020-08-11 RX ORDER — ONDANSETRON 2 MG/ML
INJECTION INTRAMUSCULAR; INTRAVENOUS AS NEEDED
Status: DISCONTINUED | OUTPATIENT
Start: 2020-08-11 | End: 2020-08-11 | Stop reason: SURG

## 2020-08-11 RX ORDER — NALOXONE HCL 0.4 MG/ML
0.1 VIAL (ML) INJECTION
Status: DISCONTINUED | OUTPATIENT
Start: 2020-08-11 | End: 2020-08-13 | Stop reason: HOSPADM

## 2020-08-11 RX ORDER — GLYCOPYRROLATE 0.2 MG/ML
INJECTION INTRAMUSCULAR; INTRAVENOUS AS NEEDED
Status: DISCONTINUED | OUTPATIENT
Start: 2020-08-11 | End: 2020-08-11 | Stop reason: SURG

## 2020-08-11 RX ORDER — HYDROCODONE BITARTRATE AND ACETAMINOPHEN 7.5; 325 MG/1; MG/1
1 TABLET ORAL EVERY 4 HOURS PRN
Status: DISCONTINUED | OUTPATIENT
Start: 2020-08-11 | End: 2020-08-13 | Stop reason: HOSPADM

## 2020-08-11 RX ORDER — MIDAZOLAM HYDROCHLORIDE 1 MG/ML
INJECTION INTRAMUSCULAR; INTRAVENOUS AS NEEDED
Status: DISCONTINUED | OUTPATIENT
Start: 2020-08-11 | End: 2020-08-11 | Stop reason: SURG

## 2020-08-11 RX ORDER — FENTANYL CITRATE 50 UG/ML
50 INJECTION, SOLUTION INTRAMUSCULAR; INTRAVENOUS
Status: DISCONTINUED | OUTPATIENT
Start: 2020-08-11 | End: 2020-08-11 | Stop reason: HOSPADM

## 2020-08-11 RX ORDER — ONDANSETRON 2 MG/ML
4 INJECTION INTRAMUSCULAR; INTRAVENOUS EVERY 6 HOURS PRN
Status: DISCONTINUED | OUTPATIENT
Start: 2020-08-11 | End: 2020-08-13 | Stop reason: HOSPADM

## 2020-08-11 RX ORDER — FAMOTIDINE 20 MG/1
20 TABLET, FILM COATED ORAL ONCE
Status: DISCONTINUED | OUTPATIENT
Start: 2020-08-11 | End: 2020-08-11 | Stop reason: HOSPADM

## 2020-08-11 RX ORDER — NEOSTIGMINE METHYLSULFATE 1 MG/ML
INJECTION, SOLUTION INTRAVENOUS AS NEEDED
Status: DISCONTINUED | OUTPATIENT
Start: 2020-08-11 | End: 2020-08-11 | Stop reason: SURG

## 2020-08-11 RX ADMIN — IBUPROFEN 400 MG: 400 TABLET ORAL at 20:26

## 2020-08-11 RX ADMIN — DIATRIZOATE MEGLUMINE AND DIATRIZOATE SODIUM 15 ML: 660; 100 LIQUID ORAL; RECTAL at 06:49

## 2020-08-11 RX ADMIN — FENTANYL CITRATE 50 MCG: 0.05 INJECTION, SOLUTION INTRAMUSCULAR; INTRAVENOUS at 16:06

## 2020-08-11 RX ADMIN — TAZOBACTAM SODIUM AND PIPERACILLIN SODIUM 3.38 G: 375; 3 INJECTION, SOLUTION INTRAVENOUS at 08:42

## 2020-08-11 RX ADMIN — HYDROMORPHONE HYDROCHLORIDE 0.5 MG: 1 INJECTION, SOLUTION INTRAMUSCULAR; INTRAVENOUS; SUBCUTANEOUS at 17:18

## 2020-08-11 RX ADMIN — LIDOCAINE HYDROCHLORIDE 50 MG: 10 INJECTION, SOLUTION INFILTRATION; PERINEURAL at 13:48

## 2020-08-11 RX ADMIN — IOPAMIDOL 100 ML: 612 INJECTION, SOLUTION INTRAVENOUS at 08:08

## 2020-08-11 RX ADMIN — TAZOBACTAM SODIUM AND PIPERACILLIN SODIUM 3.38 G: 375; 3 INJECTION, SOLUTION INTRAVENOUS at 17:18

## 2020-08-11 RX ADMIN — DEXAMETHASONE SODIUM PHOSPHATE 4 MG: 4 INJECTION, SOLUTION INTRAMUSCULAR; INTRAVENOUS at 13:54

## 2020-08-11 RX ADMIN — SODIUM CHLORIDE, PRESERVATIVE FREE 10 ML: 5 INJECTION INTRAVENOUS at 12:21

## 2020-08-11 RX ADMIN — SODIUM CHLORIDE, POTASSIUM CHLORIDE, SODIUM LACTATE AND CALCIUM CHLORIDE 9 ML/HR: 600; 310; 30; 20 INJECTION, SOLUTION INTRAVENOUS at 12:21

## 2020-08-11 RX ADMIN — HYDROMORPHONE HYDROCHLORIDE 0.5 MG: 1 INJECTION, SOLUTION INTRAMUSCULAR; INTRAVENOUS; SUBCUTANEOUS at 19:33

## 2020-08-11 RX ADMIN — ONDANSETRON 4 MG: 2 INJECTION INTRAMUSCULAR; INTRAVENOUS at 06:49

## 2020-08-11 RX ADMIN — SODIUM CHLORIDE 100 ML/HR: 9 INJECTION, SOLUTION INTRAVENOUS at 17:18

## 2020-08-11 RX ADMIN — HYDROMORPHONE HYDROCHLORIDE 0.5 MG: 1 INJECTION, SOLUTION INTRAMUSCULAR; INTRAVENOUS; SUBCUTANEOUS at 22:17

## 2020-08-11 RX ADMIN — FENTANYL CITRATE 100 MCG: 50 INJECTION, SOLUTION INTRAMUSCULAR; INTRAVENOUS at 13:44

## 2020-08-11 RX ADMIN — HYDROMORPHONE HYDROCHLORIDE 0.5 MG: 1 INJECTION, SOLUTION INTRAMUSCULAR; INTRAVENOUS; SUBCUTANEOUS at 06:49

## 2020-08-11 RX ADMIN — FENTANYL CITRATE 50 MCG: 0.05 INJECTION, SOLUTION INTRAMUSCULAR; INTRAVENOUS at 16:29

## 2020-08-11 RX ADMIN — NEOSTIGMINE 4 MG: 1 INJECTION INTRAVENOUS at 15:27

## 2020-08-11 RX ADMIN — PROPOFOL 200 MG: 10 INJECTION, EMULSION INTRAVENOUS at 13:48

## 2020-08-11 RX ADMIN — GLYCOPYRROLATE 0.4 MG: 0.2 INJECTION INTRAMUSCULAR; INTRAVENOUS at 15:27

## 2020-08-11 RX ADMIN — ONDANSETRON 4 MG: 2 INJECTION INTRAMUSCULAR; INTRAVENOUS at 22:17

## 2020-08-11 RX ADMIN — FAMOTIDINE 20 MG: 10 INJECTION, SOLUTION INTRAVENOUS at 12:21

## 2020-08-11 RX ADMIN — FAMOTIDINE 20 MG: 20 TABLET, FILM COATED ORAL at 20:26

## 2020-08-11 RX ADMIN — ONDANSETRON 4 MG: 2 INJECTION INTRAMUSCULAR; INTRAVENOUS at 13:54

## 2020-08-11 RX ADMIN — SODIUM CHLORIDE 1000 ML: 9 INJECTION, SOLUTION INTRAVENOUS at 06:49

## 2020-08-11 RX ADMIN — ROCURONIUM BROMIDE 50 MG: 10 INJECTION INTRAVENOUS at 13:48

## 2020-08-11 RX ADMIN — HYDROCODONE BITARTRATE AND ACETAMINOPHEN 1 TABLET: 7.5; 325 TABLET ORAL at 20:26

## 2020-08-11 RX ADMIN — MIDAZOLAM 2 MG: 1 INJECTION INTRAMUSCULAR; INTRAVENOUS at 13:44

## 2020-08-11 NOTE — ANESTHESIA POSTPROCEDURE EVALUATION
Patient: Chetan Gandhi    Procedure Summary     Date:  08/11/20 Room / Location:   [facility]    Anesthesia Start:  1342 Anesthesia Stop:  1541    Procedure:  APPENDECTOMY LAPAROSCOPIC (N/A Abdomen) Diagnosis:  Acute phlegmonous appendicitis    Surgeon:  Renata Conway MD Provider:  Shon Thomson MD    Anesthesia Type:  general ASA Status:  2 - Emergent          Anesthesia Type: general    Vitals  Vitals Value Taken Time   BP     Temp     Pulse     Resp     SpO2 96 % 8/11/2020  3:41 PM           Post Anesthesia Care and Evaluation    Patient location during evaluation: PACU  Patient participation: complete - patient participated  Level of consciousness: awake and alert  Pain score: 0  Pain management: adequate  Airway patency: patent  Anesthetic complications: No anesthetic complications  PONV Status: none  Cardiovascular status: hemodynamically stable and acceptable  Respiratory status: nonlabored ventilation, acceptable and nasal cannula  Hydration status: acceptable

## 2020-08-11 NOTE — ED PROVIDER NOTES
Subjective   Mr. Gandhi presents with right lower quadrant abdominal pain.  He first noticed it 5 days ago but over the last 2 days it has worsened significantly.  His appetite remains intact although he is not been moving his bowels.  He denies fevers or chills.  He has found no aggravating or alleviating factors.  He has history of chronic appendicitis treated with IV antibiotics in November of last year.  He tells me his pain went completely away after that although he has had some problems moving his bowels since that time.      History provided by:  Patient  Abdominal Pain   Pain location:  RLQ  Pain quality: dull    Pain radiates to:  Does not radiate  Pain severity:  Severe  Onset quality:  Gradual  Timing:  Constant  Progression:  Worsening  Chronicity:  Recurrent  Relieved by:  Nothing  Exacerbated by: Salsa.  Associated symptoms: constipation    Associated symptoms: no anorexia, no chest pain, no chills, no cough, no dysuria, no fever, no nausea, no shortness of breath, no sore throat and no vomiting        Review of Systems   Constitutional: Negative for chills and fever.   HENT: Negative for congestion and sore throat.    Respiratory: Negative for cough and shortness of breath.    Cardiovascular: Negative for chest pain.   Gastrointestinal: Positive for abdominal pain and constipation. Negative for anorexia, nausea and vomiting.   Genitourinary: Negative for dysuria.   Musculoskeletal: Negative for back pain.       History reviewed. No pertinent past medical history.    No Known Allergies    History reviewed. No pertinent surgical history.    Family History   Problem Relation Age of Onset   • Hemochromatosis Maternal Grandfather        Social History     Socioeconomic History   • Marital status:      Spouse name: Not on file   • Number of children: Not on file   • Years of education: Not on file   • Highest education level: Not on file   Tobacco Use   • Smoking status: Never Smoker   •  Smokeless tobacco: Never Used   Substance and Sexual Activity   • Alcohol use: No     Frequency: Never   • Drug use: No   • Sexual activity: Defer           Objective   Physical Exam   Constitutional: He is oriented to person, place, and time. He appears well-developed and well-nourished. No distress.   HENT:   Head: Normocephalic and atraumatic.   Eyes: No scleral icterus.   Cardiovascular: Normal rate and regular rhythm.   No murmur heard.  Pulmonary/Chest: Effort normal and breath sounds normal. He has no wheezes. He has no rales.   Abdominal: Bowel sounds are normal. He exhibits no distension. There is tenderness in the right lower quadrant. There is no rebound and no guarding.   Neurological: He is alert and oriented to person, place, and time.   Skin: Skin is warm and dry. Capillary refill takes less than 2 seconds. No cyanosis. No pallor.   Psychiatric: He has a normal mood and affect. His behavior is normal.   Nursing note and vitals reviewed.      Procedures           ED Course  ED Course as of Aug 11 1525   Tue Aug 11, 2020   0632 I have reviewed Mr. Gandhi's records from last year.  He requests pain medicine which I think is appropriate.  Will obtain CT scan with oral and IV contrast.    [DT]   5237 CT scan shows worsening inflammation and findings compatible with appendicitis there is also an appendicolith.  I have paged Dr. DEE.    [DT]   5422 I spoke with Dr. DEE who reviewed his CAT scan and will admit him to the hospital.    [DT]      ED Course User Index  [DT] Kobe Saba MD                                           MDM  Number of Diagnoses or Management Options  Acute abdominal pain: new and requires workup  Acute appendicitis with localized peritonitis without abscess, unspecified whether gangrene present, unspecified whether perforation present: new and requires workup     Amount and/or Complexity of Data Reviewed  Clinical lab tests: reviewed and ordered  Tests in the radiology section of  CPT®: ordered and reviewed  Review and summarize past medical records: yes  Discuss the patient with other providers: yes  Independent visualization of images, tracings, or specimens: yes    Patient Progress  Patient progress: improved      Final diagnoses:   Acute abdominal pain   Acute appendicitis with localized peritonitis without abscess, unspecified whether gangrene present, unspecified whether perforation present            Kobe Saba MD  08/11/20 5683

## 2020-08-11 NOTE — PLAN OF CARE
Patient vital signs stable and on room air. Patient complains of pain and given Dilaudid IV. Patient denies nausea and tolerating ice chips. Will continue to monitor and maintain fall and safety precautions.

## 2020-08-11 NOTE — OP NOTE
Operative Note    Chetan Gandhi  [id number]   1990     Date of Surgery:  8/11/2020    Pre-Operative Diagnosis: Acute phlegmonous appendicitis    Post-Operative Diagnosis: Same    Procedure: Laparoscopic appendectomy    Anesthesia:  General     Surgeon:  Renata Conway MD    Circulator: Henna Mayfield RN; Susan Chahal RN  Scrub Person: Benito Roque  Nursing Assistant: Kriss Mckeon; Aurelia Rider  Orientee: Everman, April     Estimated Blood Loss: Very minimal    Findings: Marked inflammation at the base of the cecum with a mass-effect in the stump of the appendix  A serosal tear was noted in the terminal ileum.    Complications: None      Indication for Procedure: Mr. Gandhi is a 30 years old gentleman who was hospitalized in November 2019 secondary to ruptured appendicitis with a phlegmon that responded to IV antibiotics at that time.  Patient did not follow-up for delayed appendectomy.  He presented emergency room earlier this morning complaining of abdominal pain that he has had for about a week mostly in the right lower quadrant.  Work-up revealed no leukocytosis however CT scan of the abdomen/pelvis was remarkable for right lower quadrant inflammation with a appendicolith noted in the appendix.  He is taken to the operating room for laparoscopic appendectomy, possible open.    Procedure: Patient was taken to the operating by anesthesia and placed supine on the table.  Following induction of general endotracheal anesthesia, SCDs were placed.  He already received Zosyn IV.  The abdomen was then prepped and draped in a sterile fashion.  Timeout was observed.  Marcaine 0.5% with epinephrine was injected in the infraumbilical region and small stab incision was made.  The fascia was incised to enter the abdominal cavity.  The Field introducer was advanced and the abdomen insufflated to 15 mmHg using CO2.  The 5 mm scope was advanced and the abdomen inspected.  No  gross abnormalities were noted.  The patient was then placed in Trendelenburg position, right side up.  A 5 mm trocar was placed in the suprapubic area as well as in the left lower quadrant under direct vision.  The right lower quadrant was inspected where the appendix was visualized.  It was consistent with a solid mass at the base of the cecum.  The terminal ileum was adhesed to the mass.  With gentle dissection was able to mobilize the terminal ileum away from the mass.  In doing so, we noted a vertical serosal tear.  Mucosa was intact.  Mass-effect was also appreciated in the base of the cecum as well.  As such I proceeded by making a window under the cecum and proceeded with transecting the cecum away from the ileocecal valve with the Sylvester 45 green load.  The mesoappendix was inflamed however it was also transected with another 45 green load.  The appendix and the base of the cecum were then placed in an Endo Catch bag and delivered at the abdomen through the umbilical port and sent to pathology.  The cecal staple line was intact with no leak or any bleeding noted.  The mesoappendix was reinforced with a clip applier.  I asked Dr. Huff for assistance in closing the serosal tear.  A 5 mm trocar was placed in the left lower quadrant as well to facilitate better visualization of the terminal ileum.  A 30 degrees 5 mm scope was then used.  We then proceeded with repair of the serosal tear in the terminal ileum with interrupted 3-0 silk Lembert sutures.  The small bowel was inspected for at least 2 feet from the ileocecal valve with no evidence of Meckel's diverticulum.  The pelvis was irrigated.  A 15 [place] round Rafael-Lipscomb drain was placed through the suprapubic port and placed in the right lower quadrant.  It was anchored to the skin with 2-0 silk suture.  The rest of the trochars were removed under direct vision with no bleeding encountered.  The abdomen was deflated.  The umbilical fascia was  approximated with 0 Vicryl sutures and the skin incisions were approximated with 4-0 Monocryl subcuticular suture.  The patient tolerated the procedure well with no complications.  He was extubated and taken to the recovery room in a stable condition.  Sponge count and needle count were correct at the end of the procedure.            Renata Conway MD  08/11/20  16:53

## 2020-08-11 NOTE — H&P
"Pre-Op H&P  Chetan Gandhi  [id number]  1990    Chief complaint: RLQ abdominal pain    HPI:    Patient is a 30 y.o.male who presents with history of subacute appendicitis with conservative management s/p IV Invanz with Dr. Mai (ID) in Nov 2019.  Symptoms completely resolved and follow up CT abd/pelvis imaging in Dec 2019 revealed significantly improved RLQ inflammation and adenopathy and resolving appendicitis.  There was a relatively small area of phlegmon along what was thought to be appendix but otherwise normal.      Five days ago, pt developed intermittent RLQ pain that worsened over the last 2 days.  Denies fever, chills, N/V/D.  Neg for poor oral intake.  Reports worsening of RLQ with eating \"Salsa\".  Presented to [facility] ED this am with labs/dx revealing:  Afebrile, Cr 1.50, WBC 10.71 and CT abd/pelvis with interval worsening of the periappendiceal stranding and inflammatory process within the RLQ, appendicolith identified with enlargement of the appendix.  Presents to preop to undergo laparoscopic appendectomy with possible open    Review of Systems:  General ROS: negative for chills, fever or skin lesions;  No changes since last office visit.  Neg for recent sick exposure  Cardiovascular ROS: no chest pain or dyspnea on exertion  Respiratory ROS: no cough, shortness of breath, or wheezing    Allergies: No Known Allergies    Home Meds:    No current facility-administered medications on file prior to encounter.      Current Outpatient Medications on File Prior to Encounter   Medication Sig Dispense Refill   • [DISCONTINUED] docusate sodium (COLACE) 100 MG capsule Take 1 capsule by mouth 2 (Two) Times a Day. 30 capsule 0   • [DISCONTINUED] ondansetron (ZOFRAN) 4 MG tablet Take 1 tablet by mouth Every 8 (Eight) Hours As Needed for Nausea or Vomiting. 20 tablet 0       PMH: History reviewed. No pertinent past medical history.  PSH:  History reviewed. No pertinent surgical history.    Social " "History:   Tobacco:   Social History     Tobacco Use   Smoking Status Never Smoker   Smokeless Tobacco Never Used      Alcohol:     Social History     Substance and Sexual Activity   Alcohol Use No   • Frequency: Never       Vitals:           /66 (BP Location: Right arm, Patient Position: Lying)   Pulse 87   Temp 97.6 °F (36.4 °C) (Temporal)   Resp 18   Ht 180.3 cm (71\")   Wt 97.5 kg (215 lb)   SpO2 97%   BMI 29.99 kg/m²     Physical Exam:  General Appearance:    Alert, cooperative, no distress, appears stated age   Head:    Normocephalic, without obvious abnormality, atraumatic   Lungs:     Clear to auscultation bilaterally, respirations unlabored    Heart:   Regular rate and rhythm, S1 and S2 normal, no murmur, rub    or gallop    Abdomen:    Soft, +bowel sounds.  +RLQ tenderness   Breast Exam:    deferred   Genitalia:    deferred   Extremities:   Extremities normal, atraumatic, no cyanosis or edema   Skin:   Skin color, texture, turgor normal, no rashes or lesions   Neurologic:   Grossly intact   Results Review  LABS:  Lab Results   Component Value Date    WBC 10.71 08/11/2020    HGB 14.8 08/11/2020    HCT 47.0 08/11/2020    MCV 85.0 08/11/2020     08/11/2020    NEUTROABS 7.23 (H) 08/11/2020    GLUCOSE 98 08/11/2020    BUN 12 08/11/2020    CREATININE 1.50 (H) 08/11/2020    EGFRIFNONA 55 (L) 08/11/2020     08/11/2020    K 4.4 08/11/2020     08/11/2020    CO2 25.0 08/11/2020    CALCIUM 10.0 08/11/2020    ALBUMIN 4.70 08/11/2020    AST 21 08/11/2020    ALT 29 08/11/2020    BILITOT 0.5 08/11/2020       RADIOLOGY:  Imaging Results (Last 72 Hours)     Procedure Component Value Units Date/Time    CT Abdomen Pelvis With Contrast [[id number]] Collected:  08/11/20 0827     Updated:  08/11/20 0855    Narrative:       EXAMINATION: CT ABDOMEN AND PELVIS WITH CONTRAST-08/11/2020:      INDICATION: Right lower quadrant pain, history of chronic appendicitis.     TECHNIQUE: Multiple axial CT imaging " was obtained of the abdomen and  pelvis following the administration of oral and intravenous contrast.     The radiation dose reduction device was turned on for each scan per the  ALARA (As Low as Reasonably Achievable) protocol.     COMPARISON: 12/13/2019.     FINDINGS:      ABDOMEN: The lung bases are grossly clear. The liver is homogeneous. No  stones seen in the gallbladder. The spleen is unremarkable. The kidneys  and adrenal glands are within normal limits. There is interval worsening  of the periappendiceal stranding and inflammatory process within the  right lower quadrant. There is an appendicolith identified with  enlargement of the appendix. There is abnormally enlarged surrounding  lymph nodes likely reactive in nature. The kidneys and adrenal glands  are within normal limits. The pancreas is homogeneous. No abdominal or  retroperitoneal adenopathy.     PELVIS: The pelvic portions of the gastrointestinal tract are within  normal limits. No free fluid or free air. No abnormal mass or fluid  collection is identified. The bony structures reveal no evidence of  osseous abnormality.       Impression:       There is worsening identified of the inflammatory changes  within the right lower quadrant. There is again enlargement of the  appendix. Findings suggesting an appendicitis with surrounding reactive  adenopathy.     D:  08/11/2020  E:  08/11/2020                I reviewed the patient's new clinical results.    Cancer Staging (if applicable)  Cancer Patient: __ yes _x_no __unknown; If yes, clinical stage T:__ N:__M:__, stage group or __N/A    Impression: 1.  Acute appendicits                        2.  History of subacute appendicitis with conservative                                     management s/p IV antibiotics Nov 2019                        3.  COLT, ? Dehydration.  History of elevated Cr with Nov 2019 episode    Plan: 1.  Laparoscopic appendectomy with possible  open            2.  Last Zosyn dose @ 0842    Priscilla WYATT Bruno, APRN   8/11/2020   12:40   I discussed the procedure at length with the patient.  The risk of anesthesia, infection, bleeding, possible bowel injury as well as open appendectomy were discussed.  He understands and is willing to proceed with the surgery.  He is already received Zosyn IV.

## 2020-08-11 NOTE — BRIEF OP NOTE
APPENDECTOMY LAPAROSCOPIC POSSIBLE OPEN  Progress Note    Chetan Gandhi  8/11/2020    Pre-op Diagnosis:   Recurrent appendicitis       Post-Op Diagnosis Codes:     * Acute phlegmonous appendicitis [K35.890]    Procedure/CPT® Codes:        Procedure(s):  APPENDECTOMY LAPAROSCOPIC    Surgeon(s):  Mikhail Huff MD Abou-Jaoude, Walid A, MD    Anesthesia: General    Staff:   Circulator: Henna Mayfield RN; Susan Chahal RN  Scrub Person: Benito Roque  Nursing Assistant: Kriss Mckeon; Aurelia Rider  Orientee: Everman, April         Estimated Blood Loss: minimal    Urine Voided: * No values recorded between 8/11/2020  1:41 PM and 8/11/2020  3:33 PM *    Specimens:                Specimens     ID Source Type Tests Collected By Collected At Frozen?      A Large Intestine, Appendix Tissue · TISSUE PATHOLOGY EXAM   Renata Conway MD 8/11/20 1449 No     This specimen was not marked as sent.                Drains:   Closed/Suction Drain 1 Abdomen Bulb 15 Fr. (Active)       Findings: Marked inflammation noted at the cecum with mass-effect noted in the appendix stump    Complications: None          Renata Conway MD     Date: 8/11/2020  Time: 15:34

## 2020-08-11 NOTE — ANESTHESIA PREPROCEDURE EVALUATION
Anesthesia Evaluation     Patient summary reviewed and Nursing notes reviewed   NPO Solid Status: > 8 hours  NPO Liquid Status: > 2 hours           Airway   Mallampati: II  TM distance: >3 FB  Neck ROM: full  No difficulty expected  Dental      Pulmonary    (-) COPD, asthma, shortness of breath, recent URI, not a smoker  Cardiovascular     (-) hypertension, past MI, dysrhythmias, angina, hyperlipidemia      Neuro/Psych  (-) seizures, CVA  GI/Hepatic/Renal/Endo    (+)   renal disease (creat 1.5   K normal ) CRI,   (-) liver disease, diabetes, no thyroid disorder    Musculoskeletal     Abdominal    Substance History      OB/GYN          Other        ROS/Med Hx Other: Creat 1.5  K4.4                   Anesthesia Plan    ASA 2 - emergent     general     intravenous induction     Anesthetic plan, all risks, benefits, and alternatives have been provided, discussed and informed consent has been obtained with: patient.    Plan discussed with CRNA.

## 2020-08-12 LAB
BASOPHILS # BLD AUTO: 0.02 10*3/MM3 (ref 0–0.2)
BASOPHILS NFR BLD AUTO: 0.2 % (ref 0–1.5)
DEPRECATED RDW RBC AUTO: 39.8 FL (ref 37–54)
EOSINOPHIL # BLD AUTO: 0 10*3/MM3 (ref 0–0.4)
EOSINOPHIL NFR BLD AUTO: 0 % (ref 0.3–6.2)
ERYTHROCYTE [DISTWIDTH] IN BLOOD BY AUTOMATED COUNT: 13 % (ref 12.3–15.4)
HCT VFR BLD AUTO: 42.7 % (ref 37.5–51)
HGB BLD-MCNC: 13.3 G/DL (ref 13–17.7)
IMM GRANULOCYTES # BLD AUTO: 0.04 10*3/MM3 (ref 0–0.05)
IMM GRANULOCYTES NFR BLD AUTO: 0.4 % (ref 0–0.5)
LYMPHOCYTES # BLD AUTO: 1.01 10*3/MM3 (ref 0.7–3.1)
LYMPHOCYTES NFR BLD AUTO: 8.9 % (ref 19.6–45.3)
MCH RBC QN AUTO: 26.4 PG (ref 26.6–33)
MCHC RBC AUTO-ENTMCNC: 31.1 G/DL (ref 31.5–35.7)
MCV RBC AUTO: 84.9 FL (ref 79–97)
MONOCYTES # BLD AUTO: 0.77 10*3/MM3 (ref 0.1–0.9)
MONOCYTES NFR BLD AUTO: 6.8 % (ref 5–12)
NEUTROPHILS NFR BLD AUTO: 83.7 % (ref 42.7–76)
NEUTROPHILS NFR BLD AUTO: 9.46 10*3/MM3 (ref 1.7–7)
NRBC BLD AUTO-RTO: 0 /100 WBC (ref 0–0.2)
PLATELET # BLD AUTO: 252 10*3/MM3 (ref 140–450)
PMV BLD AUTO: 11.8 FL (ref 6–12)
RBC # BLD AUTO: 5.03 10*6/MM3 (ref 4.14–5.8)
WBC # BLD AUTO: 11.3 10*3/MM3 (ref 3.4–10.8)

## 2020-08-12 PROCEDURE — 25010000002 HYDROMORPHONE 1 MG/ML SOLUTION: Performed by: SURGERY

## 2020-08-12 PROCEDURE — 25010000002 PIPERACILLIN SOD-TAZOBACTAM PER 1 G: Performed by: SURGERY

## 2020-08-12 PROCEDURE — 85025 COMPLETE CBC W/AUTO DIFF WBC: CPT | Performed by: SURGERY

## 2020-08-12 RX ORDER — KETOROLAC TROMETHAMINE 30 MG/ML
30 INJECTION, SOLUTION INTRAMUSCULAR; INTRAVENOUS EVERY 8 HOURS PRN
Status: DISCONTINUED | OUTPATIENT
Start: 2020-08-12 | End: 2020-08-13 | Stop reason: HOSPADM

## 2020-08-12 RX ADMIN — IBUPROFEN 400 MG: 400 TABLET ORAL at 12:49

## 2020-08-12 RX ADMIN — IBUPROFEN 400 MG: 400 TABLET ORAL at 17:32

## 2020-08-12 RX ADMIN — IBUPROFEN 400 MG: 400 TABLET ORAL at 08:57

## 2020-08-12 RX ADMIN — FAMOTIDINE 20 MG: 20 TABLET, FILM COATED ORAL at 20:37

## 2020-08-12 RX ADMIN — IBUPROFEN 400 MG: 400 TABLET ORAL at 00:54

## 2020-08-12 RX ADMIN — HYDROMORPHONE HYDROCHLORIDE 0.5 MG: 1 INJECTION, SOLUTION INTRAMUSCULAR; INTRAVENOUS; SUBCUTANEOUS at 00:54

## 2020-08-12 RX ADMIN — TAZOBACTAM SODIUM AND PIPERACILLIN SODIUM 3.38 G: 375; 3 INJECTION, SOLUTION INTRAVENOUS at 17:32

## 2020-08-12 RX ADMIN — IBUPROFEN 400 MG: 400 TABLET ORAL at 20:37

## 2020-08-12 RX ADMIN — FAMOTIDINE 20 MG: 20 TABLET, FILM COATED ORAL at 08:57

## 2020-08-12 RX ADMIN — HYDROCODONE BITARTRATE AND ACETAMINOPHEN 1 TABLET: 7.5; 325 TABLET ORAL at 10:57

## 2020-08-12 RX ADMIN — TAZOBACTAM SODIUM AND PIPERACILLIN SODIUM 3.38 G: 375; 3 INJECTION, SOLUTION INTRAVENOUS at 02:58

## 2020-08-12 RX ADMIN — IBUPROFEN 400 MG: 400 TABLET ORAL at 03:01

## 2020-08-12 RX ADMIN — TAZOBACTAM SODIUM AND PIPERACILLIN SODIUM 3.38 G: 375; 3 INJECTION, SOLUTION INTRAVENOUS at 10:52

## 2020-08-12 RX ADMIN — SODIUM CHLORIDE 100 ML/HR: 9 INJECTION, SOLUTION INTRAVENOUS at 09:05

## 2020-08-12 NOTE — PAYOR COMM NOTE
"Veronica Stephens RN Utilization Review [phone]  Fax # [phone]  Ref # [id number]          Chetan Vanegas (30 y.o. Male)     Date of Birth Social Security Number Address Home Phone MRN    1990  [address] [phone] [id number]    Shinto Marital Status          None        Admission Date Admission Type Admitting Provider Attending Provider Department, Room/Bed    8/11/20 Emergency Renata Conway MD Abou-Jaoude, Walid A, MD [address], [address]/1    Discharge Date Discharge Disposition Discharge Destination                       Attending Provider:  Renata Conway MD    Allergies:  No Known Allergies    Isolation:  None   Infection:  None   Code Status:  CPR    Ht:  180.3 cm (71\")   Wt:  97.5 kg (215 lb)    Admission Cmt:  None   Principal Problem:  None                Active Insurance as of 8/11/2020     Primary Coverage     Payor Plan Insurance Group Employer/Plan Group    [employer]OSS [facility] ClaimReturn [facility] PPO [id number]     Payor Plan Address Payor Plan Phone Number Payor Plan Fax Number Effective Dates    [address] [phone]  1/1/2018 - None Entered    [place] [address]       Subscriber Name Subscriber Birth Date Member ID       CHETAN VANEGAS 1990 [id number]                 Emergency Contacts      (Rel.) Home Phone Work Phone Mobile Phone    Cyndi Vanegas (Spouse) -- -- [phone]               History & Physical      Renata Conway MD at 08/11/20 1240          Pre-Op H&P  Chetan Vanegas  [id number]  1990    Chief complaint: RLQ abdominal pain    HPI:    Patient is a 30 y.o.male who presents with history of subacute appendicitis with conservative management s/p IV Invanz with Dr. Mai (ID) in Nov 2019.  Symptoms completely resolved and follow up CT abd/pelvis imaging in Dec 2019 revealed significantly improved RLQ inflammation and " "adenopathy and resolving appendicitis.  There was a relatively small area of phlegmon along what was thought to be appendix but otherwise normal.      Five days ago, pt developed intermittent RLQ pain that worsened over the last 2 days.  Denies fever, chills, N/V/D.  Neg for poor oral intake.  Reports worsening of RLQ with eating \"Salsa\".  Presented to [facility] ED this am with labs/dx revealing:  Afebrile, Cr 1.50, WBC 10.71 and CT abd/pelvis with interval worsening of the periappendiceal stranding and inflammatory process within the RLQ, appendicolith identified with enlargement of the appendix.  Presents to preop to undergo laparoscopic appendectomy with possible open    Review of Systems:  General ROS: negative for chills, fever or skin lesions;  No changes since last office visit.  Neg for recent sick exposure  Cardiovascular ROS: no chest pain or dyspnea on exertion  Respiratory ROS: no cough, shortness of breath, or wheezing    Allergies: No Known Allergies    Home Meds:    No current facility-administered medications on file prior to encounter.      Current Outpatient Medications on File Prior to Encounter   Medication Sig Dispense Refill   • [DISCONTINUED] docusate sodium (COLACE) 100 MG capsule Take 1 capsule by mouth 2 (Two) Times a Day. 30 capsule 0   • [DISCONTINUED] ondansetron (ZOFRAN) 4 MG tablet Take 1 tablet by mouth Every 8 (Eight) Hours As Needed for Nausea or Vomiting. 20 tablet 0       PMH: History reviewed. No pertinent past medical history.  PSH:  History reviewed. No pertinent surgical history.    Social History:   Tobacco:   Social History     Tobacco Use   Smoking Status Never Smoker   Smokeless Tobacco Never Used      Alcohol:     Social History     Substance and Sexual Activity   Alcohol Use No   • Frequency: Never       Vitals:           /66 (BP Location: Right arm, Patient Position: Lying)   Pulse 87   Temp 97.6 °F (36.4 °C) (Temporal)   Resp 18   Ht 180.3 cm (71\")   Wt 97.5 kg " (215 lb)   SpO2 97%   BMI 29.99 kg/m²      Physical Exam:  General Appearance:    Alert, cooperative, no distress, appears stated age   Head:    Normocephalic, without obvious abnormality, atraumatic   Lungs:     Clear to auscultation bilaterally, respirations unlabored    Heart:   Regular rate and rhythm, S1 and S2 normal, no murmur, rub    or gallop    Abdomen:    Soft, +bowel sounds.  +RLQ tenderness   Breast Exam:    deferred   Genitalia:    deferred   Extremities:   Extremities normal, atraumatic, no cyanosis or edema   Skin:   Skin color, texture, turgor normal, no rashes or lesions   Neurologic:   Grossly intact   Results Review  LABS:  Lab Results   Component Value Date    WBC 10.71 08/11/2020    HGB 14.8 08/11/2020    HCT 47.0 08/11/2020    MCV 85.0 08/11/2020     08/11/2020    NEUTROABS 7.23 (H) 08/11/2020    GLUCOSE 98 08/11/2020    BUN 12 08/11/2020    CREATININE 1.50 (H) 08/11/2020    EGFRIFNONA 55 (L) 08/11/2020     08/11/2020    K 4.4 08/11/2020     08/11/2020    CO2 25.0 08/11/2020    CALCIUM 10.0 08/11/2020    ALBUMIN 4.70 08/11/2020    AST 21 08/11/2020    ALT 29 08/11/2020    BILITOT 0.5 08/11/2020       RADIOLOGY:  Imaging Results (Last 72 Hours)     Procedure Component Value Units Date/Time    CT Abdomen Pelvis With Contrast [[id number]] Collected:  08/11/20 0827     Updated:  08/11/20 0855    Narrative:       EXAMINATION: CT ABDOMEN AND PELVIS WITH CONTRAST-08/11/2020:      INDICATION: Right lower quadrant pain, history of chronic appendicitis.     TECHNIQUE: Multiple axial CT imaging was obtained of the abdomen and  pelvis following the administration of oral and intravenous contrast.     The radiation dose reduction device was turned on for each scan per the  ALARA (As Low as Reasonably Achievable) protocol.     COMPARISON: 12/13/2019.     FINDINGS:      ABDOMEN: The lung bases are grossly clear. The liver is homogeneous. No  stones seen in the gallbladder. The spleen is  unremarkable. The kidneys  and adrenal glands are within normal limits. There is interval worsening  of the periappendiceal stranding and inflammatory process within the  right lower quadrant. There is an appendicolith identified with  enlargement of the appendix. There is abnormally enlarged surrounding  lymph nodes likely reactive in nature. The kidneys and adrenal glands  are within normal limits. The pancreas is homogeneous. No abdominal or  retroperitoneal adenopathy.     PELVIS: The pelvic portions of the gastrointestinal tract are within  normal limits. No free fluid or free air. No abnormal mass or fluid  collection is identified. The bony structures reveal no evidence of  osseous abnormality.       Impression:       There is worsening identified of the inflammatory changes  within the right lower quadrant. There is again enlargement of the  appendix. Findings suggesting an appendicitis with surrounding reactive  adenopathy.     D:  08/11/2020  E:  08/11/2020                I reviewed the patient's new clinical results.    Cancer Staging (if applicable)  Cancer Patient: __ yes _x_no __unknown; If yes, clinical stage T:__ N:__M:__, stage group or __N/A    Impression: 1.  Acute appendicits                        2.  History of subacute appendicitis with conservative                                     management s/p IV antibiotics Nov 2019                        3.  COLT, ? Dehydration.  History of elevated Cr with Nov 2019 episode    Plan: 1.  Laparoscopic appendectomy with possible open            2.  Last Zosyn dose @ 0842    Priscilla Bruno, APRN   8/11/2020   12:40   I discussed the procedure at length with the patient.  The risk of anesthesia, infection, bleeding, possible bowel injury as well as open appendectomy were discussed.  He understands and is willing to proceed with the surgery.  He is already received Zosyn IV.    Electronically signed by Renata Conway  MD ERIN at 08/11/20 1305          Emergency Department Notes      Kobe Saba MD at 08/11/20 0673          Subjective   Mr. Gandhi presents with right lower quadrant abdominal pain.  He first noticed it 5 days ago but over the last 2 days it has worsened significantly.  His appetite remains intact although he is not been moving his bowels.  He denies fevers or chills.  He has found no aggravating or alleviating factors.  He has history of chronic appendicitis treated with IV antibiotics in November of last year.  He tells me his pain went completely away after that although he has had some problems moving his bowels since that time.      History provided by:  Patient  Abdominal Pain   Pain location:  RLQ  Pain quality: dull    Pain radiates to:  Does not radiate  Pain severity:  Severe  Onset quality:  Gradual  Timing:  Constant  Progression:  Worsening  Chronicity:  Recurrent  Relieved by:  Nothing  Exacerbated by: Salsa.  Associated symptoms: constipation    Associated symptoms: no anorexia, no chest pain, no chills, no cough, no dysuria, no fever, no nausea, no shortness of breath, no sore throat and no vomiting        Review of Systems   Constitutional: Negative for chills and fever.   HENT: Negative for congestion and sore throat.    Respiratory: Negative for cough and shortness of breath.    Cardiovascular: Negative for chest pain.   Gastrointestinal: Positive for abdominal pain and constipation. Negative for anorexia, nausea and vomiting.   Genitourinary: Negative for dysuria.   Musculoskeletal: Negative for back pain.       History reviewed. No pertinent past medical history.    No Known Allergies    History reviewed. No pertinent surgical history.    Family History   Problem Relation Age of Onset   • Hemochromatosis Maternal Grandfather        Social History     Socioeconomic History   • Marital status:      Spouse name: Not on file   • Number of children: Not on file   • Years of education:  Not on file   • Highest education level: Not on file   Tobacco Use   • Smoking status: Never Smoker   • Smokeless tobacco: Never Used   Substance and Sexual Activity   • Alcohol use: No     Frequency: Never   • Drug use: No   • Sexual activity: Defer           Objective   Physical Exam   Constitutional: He is oriented to person, place, and time. He appears well-developed and well-nourished. No distress.   HENT:   Head: Normocephalic and atraumatic.   Eyes: No scleral icterus.   Cardiovascular: Normal rate and regular rhythm.   No murmur heard.  Pulmonary/Chest: Effort normal and breath sounds normal. He has no wheezes. He has no rales.   Abdominal: Bowel sounds are normal. He exhibits no distension. There is tenderness in the right lower quadrant. There is no rebound and no guarding.   Neurological: He is alert and oriented to person, place, and time.   Skin: Skin is warm and dry. Capillary refill takes less than 2 seconds. No cyanosis. No pallor.   Psychiatric: He has a normal mood and affect. His behavior is normal.   Nursing note and vitals reviewed.      Procedures          ED Course  ED Course as of Aug 11 1525   Tue Aug 11, 2020   0632 I have reviewed Mr. Gandhi's records from last year.  He requests pain medicine which I think is appropriate.  Will obtain CT scan with oral and IV contrast.    [DT]   1435 CT scan shows worsening inflammation and findings compatible with appendicitis there is also an appendicolith.  I have paged Dr. DEE.    [DT]   7039 I spoke with Dr. DEE who reviewed his CAT scan and will admit him to the hospital.    [DT]      ED Course User Index  [DT] Kobe Saba MD                                           [facility]  Number of Diagnoses or Management Options  Acute abdominal pain: new and requires workup  Acute appendicitis with localized peritonitis without abscess, unspecified whether gangrene present, unspecified whether perforation present: new and requires workup     Amount and/or  Complexity of Data Reviewed  Clinical lab tests: reviewed and ordered  Tests in the radiology section of CPT®:  ordered and reviewed  Review and summarize past medical records: yes  Discuss the patient with other providers: yes  Independent visualization of images, tracings, or specimens: yes    Patient Progress  Patient progress: improved      Final diagnoses:   Acute abdominal pain   Acute appendicitis with localized peritonitis without abscess, unspecified whether gangrene present, unspecified whether perforation present            Kobe Saba MD  08/11/20 1525      Electronically signed by Kobe Saba MD at 08/11/20 1525       Vital Signs (last day)     Date/Time   Temp   Temp src   Pulse   Resp   BP   Patient Position   SpO2    08/12/20 1135   97.9 (36.6)   Oral   --   18   131/90   Lying   96    08/12/20 0759   97.8 (36.6)   Oral   --   18   123/67   Lying   94    08/12/20 0500   --   --   74   --   --   --   91    08/12/20 0433   98 (36.7)   Oral   72   16   131/83   Lying   94    08/12/20 0300   --   --   64   --   --   --   96    08/12/20 0128   --   --   100   --   --   --   91    08/11/20 2317   97.9 (36.6)   Oral   75   16   155/88   Lying   94    08/11/20 2100   --   --   84   --   --   --   94    08/11/20 2014   98.6 (37)   Oral   86   16   144/87   Lying   93    08/11/20 1900   --   --   84   --   --   --   94    08/11/20 1658   98 (36.7)   Oral   89   18   145/78   Lying   95    08/11/20 1630   97.4 (36.3)   Temporal   74   14   150/88   Lying   99    08/11/20 1615   97.1 (36.2)   Temporal   73   16   125/74   Lying   94    08/11/20 1600   97.2 (36.2)   Temporal   70   14   147/89   Lying   95    08/11/20 1545   97.5 (36.4)   Temporal   71   16   138/81   Lying   94    08/11/20 1541   --   --   --   --   --   --   96    08/11/20 1537   97.4 (36.3)   Temporal   73   18   146/80   Lying   96    08/11/20 1228   97.6 (36.4)   Temporal   87   18   122/66   Lying   97    08/11/20 1130   --   --    --   --   122/70   --   97    08/11/20 11:01:11   98.3 (36.8)   Oral   72   16   121/60   --   97    08/11/20 08:12:56   --   --   70   18   145/79   --   97    08/11/20 0601   --   --   --   --   --   --   97    08/11/20 0600   --   --   --   --   128/75   --   --    08/11/20 0548   --   --   --   --   130/94   --   97    08/11/20 0543   98.1 (36.7)   Oral   74   20   132/88   Sitting   99                Current Facility-Administered Medications   Medication Dose Route Frequency Provider Last Rate Last Dose   • acetaminophen (TYLENOL) tablet 650 mg  650 mg Oral Q4H PRN Renata Conway MD        Or   • acetaminophen (TYLENOL) suppository 650 mg  650 mg Rectal Q4H PRN Renata Conway MD       • famotidine (PEPCID) tablet 20 mg  20 mg Oral BID Renata Conway MD   20 mg at 08/12/20 0857   • HYDROcodone-acetaminophen (NORCO) 7.5-325 MG per tablet 1 tablet  1 tablet Oral Q4H PRN Renata Conway MD   1 tablet at 08/12/20 1057   • HYDROmorphone (DILAUDID) injection 0.5 mg  0.5 mg Intravenous Q2H PRN Renata Conway MD   0.5 mg at 08/12/20 0054    And   • naloxone (NARCAN) injection 0.1 mg  0.1 mg Intravenous Q5 Min PRN Renata Conway MD       • ibuprofen (ADVIL,MOTRIN) tablet 400 mg  400 mg Oral Q4H Renata Conway MD   400 mg at 08/12/20 1249   • ketorolac (TORADOL) injection 30 mg  30 mg Intravenous Q8H PRN Renata Conway MD       • lactated ringers infusion  9 mL/hr Intravenous Continuous Renata Conway MD   Stopped at 08/11/20 1528   • ondansetron (ZOFRAN) tablet 4 mg  4 mg Oral Q6H PRN Renata Conway MD        Or   • ondansetron (ZOFRAN) injection 4 mg  4 mg Intravenous Q6H PRN Renata Conway MD   4 mg at 08/11/20 2217   • piperacillin-tazobactam (ZOSYN) 3.375 g in iso-osmotic dextrose 50 ml (premix)  3.375 g Intravenous Q8H Renata Conway MD 0 mL/hr at 08/11/20 2120 3.375 g at 08/12/20 1052   • sodium chloride 0.9 % flush 10 mL  10 mL  Intravenous PRN Renata Conway MD       • sodium chloride 0.9 % flush 10 mL  10 mL Intravenous PRN Renata Conway MD       • sodium chloride 0.9 % infusion  100 mL/hr Intravenous Continuous Renata Conway  mL/hr at 08/12/20 1051 100 mL/hr at 08/12/20 1051     Orders (active)      Start     Ordered    08/13/20 0600  CBC (No Diff)  Morning Draw      08/12/20 0759    08/12/20 0800  Dietary Nutrition Supplements Boost Breeze (Clear Liquid)  3 Times Daily      08/12/20 0759    08/12/20 0759  ketorolac (TORADOL) injection 30 mg  Every 8 Hours PRN      08/12/20 0759    08/12/20 0759  Diet Clear Liquid  Diet Effective Now      08/12/20 0759    08/11/20 2100  famotidine (PEPCID) tablet 20 mg  2 Times Daily      08/11/20 1538    08/11/20 2000  ibuprofen (ADVIL,MOTRIN) tablet 400 mg  Every 4 Hours Scheduled      08/11/20 1538    08/11/20 1800  piperacillin-tazobactam (ZOSYN) 3.375 g in iso-osmotic dextrose 50 ml (premix)  Every 8 Hours      08/11/20 1538    08/11/20 1607  Oxygen Therapy- Blow by - Humidified; Titrate for SPO2: equal to or greater than, 96%, per policy  Continuous      08/11/20 1606    08/11/20 1607  Pulse Oximetry, Continuous  Continuous      08/11/20 1606    08/11/20 1600  Vital Signs - Indicate Frequency  Every 4 Hours      08/11/20 1538    08/11/20 1600  Strict Intake and Output  Every Hour      08/11/20 1538    08/11/20 1540  sodium chloride 0.9 % infusion  Continuous      08/11/20 1538    08/11/20 1537  ondansetron (ZOFRAN) tablet 4 mg  Every 6 Hours PRN      08/11/20 1538    08/11/20 1537  ondansetron (ZOFRAN) injection 4 mg  Every 6 Hours PRN      08/11/20 1538    08/11/20 1537  HYDROmorphone (DILAUDID) injection 0.5 mg  Every 2 Hours PRN      08/11/20 1538    08/11/20 1537  naloxone (NARCAN) injection 0.1 mg  Every 5 Minutes PRN      08/11/20 1538    08/11/20 1537  HYDROcodone-acetaminophen (NORCO) 7.5-325 MG per tablet 1 tablet  Every 4 Hours PRN      08/11/20 1538     08/11/20 1537  acetaminophen (TYLENOL) tablet 650 mg  Every 4 Hours PRN      08/11/20 1538    08/11/20 1537  acetaminophen (TYLENOL) suppository 650 mg  Every 4 Hours PRN      08/11/20 1538    08/11/20 1537  Code Status and Medical Interventions:  Continuous      08/11/20 1538    08/11/20 1537  Maintain Sequential Compression Device  Continuous      08/11/20 1538    08/11/20 1537  Elevate HOB  Continuous      08/11/20 1538    08/11/20 1537  Activity - Ad Romina  Until Discontinued      08/11/20 1538    08/11/20 1537  Strip JOSE GUADALUPE drain  Every Shift      08/11/20 1538    08/11/20 1537  Incentive Spirometry  Every Hour While Awake      08/11/20 1538    08/11/20 1208  lactated ringers infusion  Continuous      08/11/20 1206    08/11/20 1207  ECG 12 Lead  Once     Comments:  If no ECG within the previous 6 months or any of the following: Heart/Valvular Disease; Previous Abnormal EKG; Diabetic; Renal Failure; Chest Pain; Hypertension; Emphysema/Respiratory Disease - Read by Anesthesiologist    08/11/20 1206    08/11/20 0629  Insert peripheral IV  Once      08/11/20 0628    08/11/20 0628  sodium chloride 0.9 % flush 10 mL  As Needed      08/11/20 0628    08/11/20 0549  Insert peripheral IV  Once      08/11/20 0548    08/11/20 0548  sodium chloride 0.9 % flush 10 mL  As Needed      08/11/20 0548                   Operative/Procedure Notes (all)      Renata Conway MD at 08/11/20 1358  Version 1 of 1         APPENDECTOMY LAPAROSCOPIC POSSIBLE OPEN  Progress Note    Chetan Gandhi  8/11/2020    Pre-op Diagnosis:   Recurrent appendicitis       Post-Op Diagnosis Codes:     * Acute phlegmonous appendicitis [K35.890]    Procedure/CPT® Codes:        Procedure(s):  APPENDECTOMY LAPAROSCOPIC    Surgeon(s):  Mikhail Huff MD Abou-Jaoude, Walid A, MD    Anesthesia: General    Staff:   Circulator: Henna Mayfield RN; Susan Chahal RN  Scrub Person: Benito Roque  Nursing Assistant: Neal Mckeon  Aurelia Fairbanks  [place]rientee: Everman, April         Estimated Blood Loss: minimal    Urine Voided: * No values recorded between 8/11/2020  1:41 PM and 8/11/2020  3:33 PM *    Specimens:                Specimens     ID Source Type Tests Collected By Collected At Frozen?      A Large Intestine, Appendix Tissue · TISSUE PATHOLOGY EXAM   Renata Conway MD 8/11/20 1449 No     This specimen was not marked as sent.                Drains:   Closed/Suction Drain 1 Abdomen Bulb 15 Fr. (Active)       Findings: Marked inflammation noted at the cecum with mass-effect noted in the appendix stump    Complications: None          Renata Conway MD     Date: 8/11/2020  Time: 15:34        Electronically signed by Renata Conway MD at 08/11/20 1535     Renata Conway MD at 08/11/20 1358  Version 1 of 1       Operative Note    Chetan Gandhi  [id number]   1990     Date of Surgery:  8/11/2020    Pre-Operative Diagnosis: Acute phlegmonous appendicitis    Post-Operative Diagnosis: Same    Procedure: Laparoscopic appendectomy    Anesthesia:  General     Surgeon:  Renata Conway MD    Circulator: Henna Mayfield RN; Susan Chahal RN  Scrub Person: Benito Roque  Nursing Assistant: Neal Mckeon Cassandra L  Orientee: Everman, April     Estimated Blood Loss: Very minimal    Findings: Marked inflammation at the base of the cecum with a mass-effect in the stump of the appendix  A serosal tear was noted in the terminal ileum.    Complications: None      Indication for Procedure: Mr. Gandhi is a 30 years old gentleman who was hospitalized in November 2019 secondary to ruptured appendicitis with a phlegmon that responded to IV antibiotics at that time.  Patient did not follow-up for delayed appendectomy.  He presented emergency room earlier this morning complaining of abdominal pain that he has had for about a week mostly in the right lower quadrant.  Work-up  revealed no leukocytosis however CT scan of the abdomen/pelvis was remarkable for right lower quadrant inflammation with a appendicolith noted in the appendix.  He is taken to the operating room for laparoscopic appendectomy, possible open.    Procedure: Patient was taken to the operating by anesthesia and placed supine on the table.  Following induction of general endotracheal anesthesia, SCDs were placed.  He already received Zosyn IV.  The abdomen was then prepped and draped in a sterile fashion.  Timeout was observed.  Marcaine 0.5% with epinephrine was injected in the infraumbilical region and small stab incision was made.  The fascia was incised to enter the abdominal cavity.  The Field introducer was advanced and the abdomen insufflated to 15 mmHg using CO2.  The 5 mm scope was advanced and the abdomen inspected.  No gross abnormalities were noted.  The patient was then placed in Trendelenburg position, right side up.  A 5 mm trocar was placed in the suprapubic area as well as in the left lower quadrant under direct vision.  The right lower quadrant was inspected where the appendix was visualized.  It was consistent with a solid mass at the base of the cecum.  The terminal ileum was adhesed to the mass.  With gentle dissection was able to mobilize the terminal ileum away from the mass.  In doing so, we noted a vertical serosal tear.  Mucosa was intact.  Mass-effect was also appreciated in the base of the cecum as well.  As such I proceeded by making a window under the cecum and proceeded with transecting the cecum away from the ileocecal valve with the [place] 45 green load.  The mesoappendix was inflamed however it was also transected with another 45 green load.  The appendix and the base of the cecum were then placed in an Endo Catch bag and delivered at the abdomen through the umbilical port and sent to pathology.  The cecal staple line was intact with no leak or any bleeding noted.  The mesoappendix was  "reinforced with a clip applier.  I asked Dr. Huff for assistance in closing the serosal tear.  A 5 mm trocar was placed in the left lower quadrant as well to facilitate better visualization of the terminal ileum.  A 30 degrees 5 mm scope was then used.  We then proceeded with repair of the serosal tear in the terminal ileum with interrupted 3-0 silk Lembert sutures.  The small bowel was inspected for at least 2 feet from the ileocecal valve with no evidence of Meckel's diverticulum.  The pelvis was irrigated.  A 15 Thai round Rafael-Lipscomb drain was placed through the suprapubic port and placed in the right lower quadrant.  It was anchored to the skin with 2-0 silk suture.  The rest of the trochars were removed under direct vision with no bleeding encountered.  The abdomen was deflated.  The umbilical fascia was approximated with 0 Vicryl sutures and the skin incisions were approximated with 4-0 Monocryl subcuticular suture.  The patient tolerated the procedure well with no complications.  He was extubated and taken to the recovery room in a stable condition.  Sponge count and needle count were correct at the end of the procedure.            Renata Conway MD  08/11/20  16:53    Electronically signed by Renata Conway MD at 08/11/20 1701          Physician Progress Notes (all)      Renata Conway MD at 08/12/20 0757          Chetan John Gandhi  1990  [id number]    Surgery Progress Note    Date of visit: 8/12/2020    Subjective: Feels better  Abdominal pain much improved  Hungry      Objective:    /83 (BP Location: Right arm, Patient Position: Lying)   Pulse 74   Temp 98 °F (36.7 °C) (Oral)   Resp 16   Ht 180.3 cm (71\")   Wt 97.5 kg (215 lb)   SpO2 91%   BMI 29.99 kg/m²      Intake/Output Summary (Last 24 hours) at 8/12/2020 4311  Last data filed at 8/12/2020 0637  Gross per 24 hour   Intake 1900 ml   Output 190 ml   Net 1710 ml       CV: Regular rate and rhythm  L: normal " air entry    Abd: Soft with minimal puncture site tenderness  Rafael-Lipscomb with minimal serosanguineous drainage      LABS:    Results from last 7 days   Lab Units 08/12/20  0439   WBC 10*3/mm3 11.30*   HEMOGLOBIN g/dL 13.3   HEMATOCRIT % 42.7   PLATELETS 10*3/mm3 252     Results from last 7 days   Lab Units 08/11/20  0549   SODIUM mmol/L 139   POTASSIUM mmol/L 4.4   CHLORIDE mmol/L 101   CO2 mmol/L 25.0   BUN mg/dL 12   CREATININE mg/dL 1.50*   CALCIUM mg/dL 10.0   BILIRUBIN mg/dL 0.5   ALK PHOS U/L 97   ALT (SGPT) U/L 29   AST (SGOT) U/L 21   GLUCOSE mg/dL 98     Results from last 7 days   Lab Units 08/11/20  0549   SODIUM mmol/L 139   POTASSIUM mmol/L 4.4   CHLORIDE mmol/L 101   CO2 mmol/L 25.0   BUN mg/dL 12   CREATININE mg/dL 1.50*   GLUCOSE mg/dL 98   CALCIUM mg/dL 10.0     Lab Results   Lab Value Date/Time    LIPASE 32 08/11/2020 0549    LIPASE 22 11/26/2019 2159         Assessment/ Plan: Stable course status post laparoscopic appendectomy secondary to acute phlegmonous appendicitis  Continue with IV antibiotics  Start on diet  Increase activity    Problem List Items Addressed This Visit        Digestive    Acute appendicitis    Relevant Orders    Tissue Pathology Exam       Nervous and Auditory    Acute abdominal pain - Primary            Renata Conway MD  8/12/2020  07:57      Electronically signed by Renata Conway MD at 08/12/20 4155       Consult Notes (all)    No notes of this type exist for this encounter.

## 2020-08-12 NOTE — PLAN OF CARE
Patient denies pain and nausea at this time. Patient vital signs stable and on room air. Patient uses incentive spirometer and ambulates independently. Patient is tolerating clear liquid diet. Patient denies passing flatus.Will continue to monitor and maintain fall and safety precautions.

## 2020-08-12 NOTE — PROGRESS NOTES
Discharge Planning Assessment  [facility]     Patient Name: Chetan Gandhi  MRN: [id number]  Today's Date: 8/12/2020    Admit Date: 8/11/2020    Discharge Needs Assessment     Row Name 08/12/20 0922       Living Environment    Lives With  spouse    Name(s) of Who Lives With Patient  Kassidy    Current Living Arrangements  home/apartment/condo Lives in [place]    Primary Care Provided by  self    Provides Primary Care For  no one    Family Caregiver if Needed  spouse    Family Caregiver Names  Kassidy    Quality of Family Relationships  supportive    Able to Return to Prior Arrangements  yes       Resource/Environmental Concerns    Resource/Environmental Concerns  none    Transportation Concerns  car, none       Transition Planning    Patient/Family Anticipates Transition to  home with family    Patient/Family Anticipated Services at Transition  none    Transportation Anticipated  family or friend will provide       Discharge Needs Assessment    Concerns to be Addressed  no discharge needs identified    Equipment Currently Used at Home  none    Anticipated Changes Related to Illness  none    Equipment Needed After Discharge  none        Discharge Plan     Row Name 08/12/20 0923       Plan    Plan  Home with wife    Patient/Family in Agreement with Plan  yes    Plan Comments  Pt lives with his wife in [place]. Is independent of ADL's and employed full time. No d/c needs at this time. CM will follow. Amber Howell RN, CM ext. [phone]    Final Discharge Disposition Code  01 - home or self-care        Destination      Coordination has not been started for this encounter.      Durable Medical Equipment      Coordination has not been started for this encounter.      Dialysis/Infusion      Coordination has not been started for this encounter.      Home Medical Care      Coordination has not been started for this encounter.      Therapy      Coordination has not been started for this encounter.       Community Resources      Coordination has not been started for this encounter.          Demographic Summary     Row Name 08/12/20 0921       General Information    Referral Source  admission list    Preferred Language  English     Used During This Interaction  no    General Information Comments  PCP is Juwan Bryson       Contact Information    Permission Granted to Share Info With          Functional Status     Row Name 08/12/20 0922       Functional Status    Usual Activity Tolerance  excellent    Current Activity Tolerance  excellent       Functional Status, IADL    Medications  independent    Meal Preparation  independent    Housekeeping  independent    Laundry  independent    Shopping  independent       Employment/    Employment Status  employed full time      Employment/ Comments  Has [employer]. No issues affording medications.         Psychosocial    No documentation.       Abuse/Neglect    No documentation.       Legal    No documentation.       Substance Abuse    No documentation.       Patient Forms    No documentation.           Amber Howell RN

## 2020-08-12 NOTE — PLAN OF CARE
Problem: Patient Care Overview  Goal: Plan of Care Review  Outcome: Ongoing (interventions implemented as appropriate)  Flowsheets  Taken 8/12/2020 0415  Progress: improving  Outcome Summary: Patient has had a decent shift, sleeping on and off tonight. VSS, and patient has been alert and oriented times four. Patient asked for pain medications multiple times earlier in the shift, but has seemed to have eased himself off as the night has progressed. Nursing staff will continue to monitor and assess the patient.  Taken 8/11/2020 2000  Plan of Care Reviewed With: patient  Goal: Individualization and Mutuality  Outcome: Ongoing (interventions implemented as appropriate)  Flowsheets (Taken 8/12/2020 0415)  Patient Specific Goals (Include Timeframe): Monitor and assess for pain q2hrs and prn     Problem: Appendectomy (Adult)  Goal: Signs and Symptoms of Listed Potential Problems Will be Absent, Minimized or Managed (Appendectomy)  Outcome: Ongoing (interventions implemented as appropriate)  Flowsheets (Taken 8/12/2020 0415)  Problems Assessed (Appendectomy): all  Problems Present (Appendectomy): pain; postoperative nausea and vomiting     Problem: Pain, Acute (Adult)  Goal: Identify Related Risk Factors and Signs and Symptoms  Outcome: Ongoing (interventions implemented as appropriate)  Flowsheets (Taken 8/12/2020 0415)  Related Risk Factors (Acute Pain): surgery  Signs and Symptoms (Acute Pain): verbalization of pain descriptors  Goal: Acceptable Pain Control/Comfort Level  Outcome: Ongoing (interventions implemented as appropriate)  Flowsheets (Taken 8/12/2020 0415)  Acceptable Pain Control/Comfort Level: making progress toward outcome     Problem: Nausea/Vomiting (Adult)  Goal: Identify Related Risk Factors and Signs and Symptoms  Outcome: Ongoing (interventions implemented as appropriate)  Flowsheets (Taken 8/12/2020 0415)  Related Risk Factors (Nausea/Vomiting): recent surgery with general anesthesia  Signs and  Symptoms (Nausea/Vomiting): report of queasy sensation  Goal: Symptom Relief  Outcome: Ongoing (interventions implemented as appropriate)  Flowsheets (Taken 8/12/2020 2104)  Symptom Relief: making progress toward outcome  Goal: Adequate Hydration  Outcome: Ongoing (interventions implemented as appropriate)  Flowsheets (Taken 8/12/2020 3017)  Adequate Hydration: making progress toward outcome

## 2020-08-12 NOTE — PROGRESS NOTES
"Chetan Gandhi  1990  [id number]    Surgery Progress Note    Date of visit: 8/12/2020    Subjective: Feels better  Abdominal pain much improved  Hungry      Objective:    /83 (BP Location: Right arm, Patient Position: Lying)   Pulse 74   Temp 98 °F (36.7 °C) (Oral)   Resp 16   Ht 180.3 cm (71\")   Wt 97.5 kg (215 lb)   SpO2 91%   BMI 29.99 kg/m²     Intake/Output Summary (Last 24 hours) at 8/12/2020 0757  Last data filed at 8/12/2020 0637  Gross per 24 hour   Intake 1900 ml   Output 190 ml   Net 1710 ml       CV: Regular rate and rhythm  L: normal air entry    Abd: Soft with minimal puncture site tenderness  Rafael-Lipscomb with minimal serosanguineous drainage      LABS:    Results from last 7 days   Lab Units 08/12/20  0439   WBC 10*3/mm3 11.30*   HEMOGLOBIN g/dL 13.3   HEMATOCRIT % 42.7   PLATELETS 10*3/mm3 252     Results from last 7 days   Lab Units 08/11/20  0549   SODIUM mmol/L 139   POTASSIUM mmol/L 4.4   CHLORIDE mmol/L 101   CO2 mmol/L 25.0   BUN mg/dL 12   CREATININE mg/dL 1.50*   CALCIUM mg/dL 10.0   BILIRUBIN mg/dL 0.5   ALK PHOS U/L 97   ALT (SGPT) U/L 29   AST (SGOT) U/L 21   GLUCOSE mg/dL 98     Results from last 7 days   Lab Units 08/11/20  0549   SODIUM mmol/L 139   POTASSIUM mmol/L 4.4   CHLORIDE mmol/L 101   CO2 mmol/L 25.0   BUN mg/dL 12   CREATININE mg/dL 1.50*   GLUCOSE mg/dL 98   CALCIUM mg/dL 10.0     Lab Results   Lab Value Date/Time    LIPASE 32 08/11/2020 0549    LIPASE 22 11/26/2019 2159         Assessment/ Plan: Stable course status post laparoscopic appendectomy secondary to acute phlegmonous appendicitis  Continue with IV antibiotics  Start on diet  Increase activity    Problem List Items Addressed This Visit        Digestive    Acute appendicitis    Relevant Orders    Tissue Pathology Exam       Nervous and Auditory    Acute abdominal pain - Primary            Renata Conway MD  8/12/2020  07:57    "

## 2020-08-13 ENCOUNTER — READMISSION MANAGEMENT (OUTPATIENT)
Dept: CALL CENTER | Facility: HOSPITAL | Age: 30
End: 2020-08-13

## 2020-08-13 ENCOUNTER — NURSE TRIAGE (OUTPATIENT)
Dept: CALL CENTER | Facility: HOSPITAL | Age: 30
End: 2020-08-13

## 2020-08-13 VITALS
WEIGHT: 215 LBS | SYSTOLIC BLOOD PRESSURE: 114 MMHG | TEMPERATURE: 97.9 F | RESPIRATION RATE: 16 BRPM | OXYGEN SATURATION: 97 % | BODY MASS INDEX: 30.1 KG/M2 | HEIGHT: 71 IN | DIASTOLIC BLOOD PRESSURE: 69 MMHG | HEART RATE: 70 BPM

## 2020-08-13 LAB
CYTO UR: NORMAL
DEPRECATED RDW RBC AUTO: 40.9 FL (ref 37–54)
ERYTHROCYTE [DISTWIDTH] IN BLOOD BY AUTOMATED COUNT: 13.1 % (ref 12.3–15.4)
HCT VFR BLD AUTO: 39.5 % (ref 37.5–51)
HGB BLD-MCNC: 12.4 G/DL (ref 13–17.7)
LAB AP CASE REPORT: NORMAL
LAB AP CLINICAL INFORMATION: NORMAL
MCH RBC QN AUTO: 27.1 PG (ref 26.6–33)
MCHC RBC AUTO-ENTMCNC: 31.4 G/DL (ref 31.5–35.7)
MCV RBC AUTO: 86.2 FL (ref 79–97)
PATH REPORT.FINAL DX SPEC: NORMAL
PATH REPORT.GROSS SPEC: NORMAL
PLATELET # BLD AUTO: 247 10*3/MM3 (ref 140–450)
PMV BLD AUTO: 11.5 FL (ref 6–12)
RBC # BLD AUTO: 4.58 10*6/MM3 (ref 4.14–5.8)
WBC # BLD AUTO: 8.67 10*3/MM3 (ref 3.4–10.8)

## 2020-08-13 PROCEDURE — 85027 COMPLETE CBC AUTOMATED: CPT | Performed by: SURGERY

## 2020-08-13 PROCEDURE — 25010000002 PIPERACILLIN SOD-TAZOBACTAM PER 1 G: Performed by: SURGERY

## 2020-08-13 RX ADMIN — IBUPROFEN 400 MG: 400 TABLET ORAL at 04:55

## 2020-08-13 RX ADMIN — IBUPROFEN 400 MG: 400 TABLET ORAL at 00:59

## 2020-08-13 RX ADMIN — FAMOTIDINE 20 MG: 20 TABLET, FILM COATED ORAL at 09:43

## 2020-08-13 RX ADMIN — SODIUM CHLORIDE 100 ML/HR: 9 INJECTION, SOLUTION INTRAVENOUS at 06:41

## 2020-08-13 RX ADMIN — IBUPROFEN 400 MG: 400 TABLET ORAL at 09:43

## 2020-08-13 RX ADMIN — IBUPROFEN 400 MG: 400 TABLET ORAL at 12:52

## 2020-08-13 RX ADMIN — TAZOBACTAM SODIUM AND PIPERACILLIN SODIUM 3.38 G: 375; 3 INJECTION, SOLUTION INTRAVENOUS at 09:43

## 2020-08-13 RX ADMIN — TAZOBACTAM SODIUM AND PIPERACILLIN SODIUM 3.38 G: 375; 3 INJECTION, SOLUTION INTRAVENOUS at 01:00

## 2020-08-13 NOTE — OUTREACH NOTE
Prep Survey      Responses   [facility] facility patient discharged from?  [place]   Is LACE score < 7 ?  Yes   Eligibility  [facility]   Date of Admission  08/11/20   Date of Discharge  08/13/20   Discharge Disposition  Home or Self Care   Discharge diagnosis  APPENDECTOMY   COVID-19 Test Status  Negative   Does the patient have one of the following disease processes/diagnoses(primary or secondary)?  General Surgery   Does the patient have Home health ordered?  No   Is there a DME ordered?  No   Prep survey completed?  Yes          Vero Menon RN

## 2020-08-13 NOTE — DISCHARGE SUMMARY
Discharge Summary  Date of Discharge:  8/13/2020    Discharge Diagnosis: Subacute appendicitis    Presenting Problem/History of Present Illness  Acute abdominal pain [R10.9]       Hospital Course  Patient is a 30 y.o. male presented with with recurrent appendicitis.  The patient was hospitalized in November secondary to ruptured appendicitis and a phlegmon that responded to IV antibiotics.  He did well.  He did not follow-up in the office.  He presented emergency room on 8/11/2020 with 1 week history of abdominal pain.  CT scan of the abdomen pelvis showed right lower quadrant inflammation with concern about recurrent appendicitis.  Patient was evaluated, started on IV antibiotics, and taken to the operating room for laparoscopic appendectomy.  He tolerated procedure well.  Rafael-Lipscomb was placed.  Postoperatively he was maintained on IV antibiotics.  He was started on clear liquid diet advance to regular diet which she has tolerated well.  He has been ambulating well and having flatus.  He has been on Toradol and ibuprofen.  Final pathology showed subacute appendicitis secondary to history of ruptured appendicitis.  At this time he is doing well.  I plan to remove the Rafael-Lipscomb drain and discharge him home.    Procedures Performed  Procedure(s):  APPENDECTOMY LAPAROSCOPIC       Consults:   Consults     No orders found from 7/13/2020 to 8/12/2020.          Condition on Discharge: Stable    Discharge Disposition  Home or Self Care    Discharge Medications     Discharge Medications      Patient Not Prescribed Medications Upon Discharge         Discharge Diet: Regular diet    Activity at Discharge: Avoid any heavy lifting more than 10 pounds for 1 month    Follow-up Appointments  No future appointments.  Additional Instructions for the Follow-ups that You Need to Schedule     Discharge Follow-up with Specified Provider: DR Conway; 1 Week   As directed      To:  DR Conway    Follow Up:  1 Week                  Renata Conway MD  08/13/20  15:16

## 2020-08-13 NOTE — PLAN OF CARE
Problem: Patient Care Overview  Goal: Plan of Care Review  Outcome: Ongoing (interventions implemented as appropriate)  Flowsheets  Taken 8/13/2020 0326  Progress: improving  Outcome Summary: Patient has had a decent shift, sleeping for most of the evening. VSS, and patient has been alert and oriented times four. No complaints of pain tonight. Nursing staff will continue to monitor and assess the patient.  Taken 8/12/2020 2000  Plan of Care Reviewed With: patient  Goal: Individualization and Mutuality  Outcome: Ongoing (interventions implemented as appropriate)  Flowsheets (Taken 8/12/2020 8685)  Patient Specific Goals (Include Timeframe): Monitor and assess for pain q2hrs and prn     Problem: Appendectomy (Adult)  Goal: Signs and Symptoms of Listed Potential Problems Will be Absent, Minimized or Managed (Appendectomy)  Outcome: Ongoing (interventions implemented as appropriate)  Flowsheets  Taken 8/12/2020 0415  Problems Assessed (Appendectomy): all  Taken 8/13/2020 0326  Problems Present (Appendectomy): none

## 2020-08-13 NOTE — PROGRESS NOTES
"Chetan Gandhi  1990  [id number]    Surgery Progress Note    Date of visit: 8/13/2020    Subjective: Feels better  Tolerating clear liquids  Having flatus  Ambulating    Objective:    /65 (BP Location: Right arm, Patient Position: Lying)   Pulse 70   Temp 97.8 °F (36.6 °C) (Oral)   Resp 14   Ht 180.3 cm (71\")   Wt 97.5 kg (215 lb)   SpO2 93%   BMI 29.99 kg/m²     Intake/Output Summary (Last 24 hours) at 8/13/2020 0745  Last data filed at 8/13/2020 0600  Gross per 24 hour   Intake 920 ml   Output 100 ml   Net 820 ml       CV: Regular rate and rhythm  L: normal air entry    Abd: Soft with minimal puncture site tenderness  Rafael-Lipscomb with mostly serous drainage      LABS:    Results from last 7 days   Lab Units 08/12/20  0439   WBC 10*3/mm3 11.30*   HEMOGLOBIN g/dL 13.3   HEMATOCRIT % 42.7   PLATELETS 10*3/mm3 252     Results from last 7 days   Lab Units 08/11/20  0549   SODIUM mmol/L 139   POTASSIUM mmol/L 4.4   CHLORIDE mmol/L 101   CO2 mmol/L 25.0   BUN mg/dL 12   CREATININE mg/dL 1.50*   CALCIUM mg/dL 10.0   BILIRUBIN mg/dL 0.5   ALK PHOS U/L 97   ALT (SGPT) U/L 29   AST (SGOT) U/L 21   GLUCOSE mg/dL 98     Results from last 7 days   Lab Units 08/11/20  0549   SODIUM mmol/L 139   POTASSIUM mmol/L 4.4   CHLORIDE mmol/L 101   CO2 mmol/L 25.0   BUN mg/dL 12   CREATININE mg/dL 1.50*   GLUCOSE mg/dL 98   CALCIUM mg/dL 10.0     Lab Results   Lab Value Date/Time    LIPASE 32 08/11/2020 0549    LIPASE 22 11/26/2019 2159         Assessment/ Plan:   Stable course status post laparoscopic appendectomy secondary to acute phlegmonous appendicitis  Pathology still pending  Patient is progressing nicely  Plan to advance diet  Home later today or in the morning    Problem List Items Addressed This Visit        Digestive    Acute appendicitis    Relevant Orders    Tissue Pathology Exam       Nervous and Auditory    Acute abdominal pain - Primary            Renata Conway MD  8/13/2020  07:45    "

## 2020-08-13 NOTE — PLAN OF CARE
Problem: Patient Care Overview  Goal: Plan of Care Review  Outcome: Ongoing (interventions implemented as appropriate)  Flowsheets (Taken 8/13/2020 1600)  Progress: improving  Plan of Care Reviewed With: patient  Outcome Summary: Pt pain well controlled with non-narcotics. JOSE GUADALUPE drain removed. Tolerating GI soft diet. P to be discharged.     Problem: Appendectomy (Adult)  Goal: Signs and Symptoms of Listed Potential Problems Will be Absent, Minimized or Managed (Appendectomy)  Outcome: Ongoing (interventions implemented as appropriate)     Problem: Pain, Acute (Adult)  Goal: Identify Related Risk Factors and Signs and Symptoms  Outcome: Ongoing (interventions implemented as appropriate)     Problem: Nausea/Vomiting (Adult)  Goal: Identify Related Risk Factors and Signs and Symptoms  Outcome: Ongoing (interventions implemented as appropriate)

## 2020-08-14 ENCOUNTER — TRANSITIONAL CARE MANAGEMENT TELEPHONE ENCOUNTER (OUTPATIENT)
Dept: CALL CENTER | Facility: HOSPITAL | Age: 30
End: 2020-08-14

## 2020-08-14 NOTE — PAYOR COMM NOTE
"Veronica Stephens RN Utilization Review [phone]  Fax # [phone]  Ref # [id number]    Auth still pending. Please note discharge date.    Chetan Vanegas (30 y.o. Male)     Date of Birth Social Security Number Address Home Phone MRN    1990  [address] [place] [address] [phone] [id number]    [facility] [facility] Status          None        Admission Date Admission Type Admitting Provider Attending Provider Department, Room/Bed    8/11/20 Emergency Renata Conway MD  [address], [address]    Discharge Date Discharge Disposition Discharge Destination        8/13/2020 Home or Self Care              Attending Provider:  (none)   Allergies:  No Known Allergies    Isolation:  None   Infection:  None   Code Status:  Prior    Ht:  180.3 cm (71\")   Wt:  97.5 kg (215 lb)    Admission Cmt:  None   Principal Problem:  None                Active Insurance as of 8/11/2020     Primary Coverage     Payor Plan Insurance Group Employer/Plan Group    [id number] PPO [id number]     Payor Plan Address Payor Plan Phone Number Payor Plan Fax Number Effective Dates    [phone]  1/1/2018 - None Entered    [place] [address]       Subscriber Name Subscriber Birth Date Member ID       CHETAN VANEGAS 1990 [id number]                 Emergency Contacts      (Rel.) Home Phone Work Phone Mobile Phone    Cyndi Vanegas (Spouse) -- -- [phone]               Discharge Summary      Renata Conway MD at 08/13/20 1516          Discharge Summary  Date of Discharge:  8/13/2020    Discharge Diagnosis: Subacute appendicitis    Presenting Problem/History of Present Illness  Acute abdominal pain [R10.9]       Hospital Course  Patient is a 30 y.o. male presented with with recurrent appendicitis.  The patient was hospitalized in November secondary to ruptured appendicitis and a phlegmon that " responded to IV antibiotics.  He did well.  He did not follow-up in the office.  He presented emergency room on 8/11/2020 with 1 week history of abdominal pain.  CT scan of the abdomen pelvis showed right lower quadrant inflammation with concern about recurrent appendicitis.  Patient was evaluated, started on IV antibiotics, and taken to the operating room for laparoscopic appendectomy.  He tolerated procedure well.  Rafael-Lipscomb was placed.  Postoperatively he was maintained on IV antibiotics.  He was started on clear liquid diet advance to regular diet which she has tolerated well.  He has been ambulating well and having flatus.  He has been on Toradol and ibuprofen.  Final pathology showed subacute appendicitis secondary to history of ruptured appendicitis.  At this time he is doing well.  I plan to remove the Rafael-Lipscomb drain and discharge him home.    Procedures Performed  Procedure(s):  APPENDECTOMY LAPAROSCOPIC       Consults:   Consults     No orders found from 7/13/2020 to 8/12/2020.          Condition on Discharge: Stable    Discharge Disposition  Home or Self Care    Discharge Medications     Discharge Medications      Patient Not Prescribed Medications Upon Discharge         Discharge Diet: Regular diet    Activity at Discharge: Avoid any heavy lifting more than 10 pounds for 1 month    Follow-up Appointments  No future appointments.  Additional Instructions for the Follow-ups that You Need to Schedule     Discharge Follow-up with Specified Provider: DR Conway; 1 Week   As directed      To:  DR Conway    Follow Up:  1 Week                 Renata Conway MD  08/13/20  15:16              Electronically signed by Renata Conway MD at 08/13/20 1518       Discharge Order (From admission, onward)     Start     Ordered    08/13/20 1516  Discharge patient  Once     Expected Discharge Date:  08/13/20    Discharge Disposition:  Home or Self Care    Physician of Record for Attribution -  Please select from Treatment Team:  SLIME SUAREZ [[id number]]    Review needed by CMO to determine Physician of Record:  No       Question Answer Comment   Physician of Record for Attribution - Please select from Treatment Team SLIME SUAREZ    Review needed by CMO to determine Physician of Record No        08/13/20 1513

## 2020-08-14 NOTE — TELEPHONE ENCOUNTER
"Caller states significant other came home today from hospital today and now has fever 101 and now 101.6. Caller had Appendectomy on Tuesday. Caller advised per guideline. Caller is going to contact his Surgeon at this time but also aware if no call back in thirty minutes should proceed on to ER as advised. Caller aware should he become weak/lightheaded, chills or worse after getting off phone should call 911.     Reason for Disposition  • Fever > 100.4 F (38.0 C)    Additional Information  • Negative: Sounds like a life-threatening emergency to the triager  • Negative: Chest pain  • Negative: Difficulty breathing  • Negative: Acting confused (e.g., disoriented, slurred speech) or excessively sleepy  • Negative: Surgical incision symptoms and questions  • Negative: [1] Discomfort (pain, burning or stinging) when passing urine AND [2] male  • Negative: [1] Discomfort (pain, burning or stinging) when passing urine AND [2] female  • Negative: Constipation  • Negative: New or worsening leg (calf, thigh) pain  • Negative: New or worsening leg swelling  • Negative: Dizziness is severe, or persists > 24 hours after surgery  • Negative: Pain, redness, swelling, or pus at IV Site  • Negative: Symptoms arising from use of a urinary catheter (Riley or Coude)  • Negative: Cast problems or questions  • Negative: Medication question  • Negative: [1] Widespread rash AND [2] bright red, sunburn-like  • Negative: [1] SEVERE headache AND [2] after spinal (epidural) anesthesia  • Negative: [1] Vomiting AND [2] persists > 4 hours  • Negative: [1] Vomiting AND [2] abdomen looks much more swollen than usual  • Negative: [1] Drinking very little AND [2] dehydration suspected (e.g., no urine > 12 hours, very dry mouth, very lightheaded)  • Negative: Patient sounds very sick or weak to the triager  • Negative: Sounds like a serious complication to the triager    Answer Assessment - Initial Assessment Questions  1. SYMPTOM: \"What's the main " "symptom you're concerned about?\" (e.g., pain, fever, vomiting)      Fever   2. ONSET: \"When did  Fever start?\"      Within last hour   3. SURGERY: \"What surgery was performed?\"      Appendectomy Tuesday   4. DATE of SURGERY: \"When was surgery performed?\"       Tuesday   5. ANESTHESIA: \" What type of anesthesia did you have?\" (e.g., general, spinal, epidural, local)        6. PAIN: \"Is there any pain?\" If so, ask: \"How bad is it?\"  (Scale 1-10; or mild, moderate, severe)      2-3  7. FEVER: \"Do you have a fever?\" If so, ask: \"What is your temperature, how was it measured, and when did it start?\"     101.6  8. VOMITING: \"Is there any vomiting?\" If yes, ask: \"How many times?\"       Denies   9. BLEEDING: \"Is there any bleeding?\" If so, ask: \"How much?\" and \"Where?\"      Denies   10. OTHER SYMPTOMS: \"Do you have any other symptoms?\" (e.g., drainage from wound, painful urination, constipation)        Denies    Protocols used: POST-OP SYMPTOMS AND QUESTIONS-ADULT-      "

## 2020-08-14 NOTE — OUTREACH NOTE
Call Center TCM Note      Responses   [facility] patient discharged from?  [facility]   Does the patient have one of the following disease processes/diagnoses(primary or secondary)?  General Surgery   TCM attempt successful?  Yes   Call start time  0932   Call end time  0942   Discharge diagnosis  APPENDECTOMY   Meds reviewed with patient/caregiver?  Yes   Is the patient having any side effects they believe may be caused by any medication additions or changes?  No   Does the patient have all medications related to this admission filled (includes all antibiotics, pain medications, etc.)  N/A   Is the patient taking all medications as directed (includes completed medication regime)?  Yes   Does the patient have a follow up appointment scheduled with their surgeon?  Yes [8/20/20]   Has the patient kept scheduled appointments due by today?  N/A   Comments  PCP appt 8/20/20 at 2:30   Psychosocial issues?  No   Did the patient receive a copy of their discharge instructions?  Yes   Nursing interventions  Reviewed instructions with patient   What is the patient's perception of their health status since discharge?  Improving [Pt had a t-max of 101.6 with medication last night and HR was 101-118. Today at 8:00 am temp was 98.7 and HR was 75.]   Nursing interventions  Nurse provided patient education   Is the patient /caregiver able to teach back basic post-op care?  Lifting as instructed by MD in discharge instructions   Is the patient/caregiver able to teach back signs and symptoms of incisional infection?  Fever   Is the patient/caregiver able to teach back steps to recovery at home?  Rest and rebuild strength, gradually increase activity   If the patient is a current smoker, are they able to teach back resources for cessation?  -- [Nonsmoker]   Is the patient/caregiver able to teach back the hierarchy of who to call/visit for symptoms/problems? PCP, Specialist, Home health nurse, Urgent Care, ED, 911  Yes   TCM call  completed?  Yes          Gay Regalado, RN    8/14/2020, 09:42

## 2020-08-20 NOTE — PAYOR COMM NOTE
"  Veronica Stephens RN Utilization Review [phone]  Fax # [phone]  Ref # [id number]      Approved 1 IP day, auth needed for one additional day. Pt discharged on 8/13/20.   Please call or fax with additional auth      Chetan Vanegas Lynette (30 y.o. Male)     Date of Birth Social Security Number Address Home Phone MRN    1990  [id number]   [place] [id number] [phone] [id number]    [employer] Marital Status          None        Admission Date Admission Type Admitting Provider Attending Provider Department, Room/Bed    8/11/20 Emergency Renata Conway MD  [address], [address]/1    Discharge Date Discharge Disposition Discharge Destination        8/13/2020 Home or Self Care              Attending Provider:  (none)   Allergies:  No Known Allergies    Isolation:  None   Infection:  None   Code Status:  Prior    Ht:  180.3 cm (71\")   Wt:  97.5 kg (215 lb)    Admission Cmt:  None   Principal Problem:  None                Active Insurance as of 8/11/2020     Primary Coverage     Payor Plan Insurance Group Employer/Plan Group    [employer] T3 [employer] BLUE [employer] PPO [id number]     Payor Plan Address Payor Plan Phone Number Payor Plan Fax Number Effective Dates    [address] [phone]  1/1/2018 - None Entered    [place] [address]       Subscriber Name Subscriber Birth Date Member ID       CHETAN VANEGAS LYNETTE 1990 [id number]                 Emergency Contacts      (Rel.) Home Phone Work Phone Mobile Phone    Cyndi Vanegas (Spouse) -- -- [phone]               Physician Progress Notes (most recent note)      Renata Conway MD at 08/13/20 0744          Chetan Lynette Vanegas  1990  [id number]    Surgery Progress Note    Date of visit: 8/13/2020    Subjective: Feels better  Tolerating clear liquids  Having flatus  Ambulating    Objective:    /65 (BP Location: Right arm, Patient Position: Lying)   " "Pulse 70   Temp 97.8 °F (36.6 °C) (Oral)   Resp 14   Ht 180.3 cm (71\")   Wt 97.5 kg (215 lb)   SpO2 93%   BMI 29.99 kg/m²      Intake/Output Summary (Last 24 hours) at 8/13/2020 0745  Last data filed at 8/13/2020 0600  Gross per 24 hour   Intake 920 ml   Output 100 ml   Net 820 ml       CV: Regular rate and rhythm  L: normal air entry    Abd: Soft with minimal puncture site tenderness  Rafael-Lipscomb with mostly serous drainage      LABS:    Results from last 7 days   Lab Units 08/12/20  0439   WBC 10*3/mm3 11.30*   HEMOGLOBIN g/dL 13.3   HEMATOCRIT % 42.7   PLATELETS 10*3/mm3 252     Results from last 7 days   Lab Units 08/11/20  0549   SODIUM mmol/L 139   POTASSIUM mmol/L 4.4   CHLORIDE mmol/L 101   CO2 mmol/L 25.0   BUN mg/dL 12   CREATININE mg/dL 1.50*   CALCIUM mg/dL 10.0   BILIRUBIN mg/dL 0.5   ALK PHOS U/L 97   ALT (SGPT) U/L 29   AST (SGOT) U/L 21   GLUCOSE mg/dL 98     Results from last 7 days   Lab Units 08/11/20  0549   SODIUM mmol/L 139   POTASSIUM mmol/L 4.4   CHLORIDE mmol/L 101   CO2 mmol/L 25.0   BUN mg/dL 12   CREATININE mg/dL 1.50*   GLUCOSE mg/dL 98   CALCIUM mg/dL 10.0     Lab Results   Lab Value Date/Time    LIPASE 32 08/11/2020 0549    LIPASE 22 11/26/2019 2159         Assessment/ Plan:   Stable course status post laparoscopic appendectomy secondary to acute phlegmonous appendicitis  Pathology still pending  Patient is progressing nicely  Plan to advance diet  Home later today or in the morning    Problem List Items Addressed This Visit        Digestive    Acute appendicitis    Relevant Orders    Tissue Pathology Exam       Nervous and Auditory    Acute abdominal pain - Primary            Renata Conway MD  8/13/2020  07:45      Electronically signed by Renata Conway MD at 08/13/20 0746       Renata Conway MD   Physician   Surgery   Progress Notes   Signed   Date of Service:  08/12/20 0757   Creation Time:  08/12/20 0757            Signed             Show:Clear " "all  [x]Manual[x]Template[]Copied    Added by:  [x]Renata Conway MD    []Katie for details  Chetan Gandhi  1990  [id number]     Surgery Progress Note     Date of visit: 8/12/2020     Subjective: Feels better  Abdominal pain much improved  Hungry        Objective:     /83 (BP Location: Right arm, Patient Position: Lying)   Pulse 74   Temp 98 °F (36.7 °C) (Oral)   Resp 16   Ht 180.3 cm (71\")   Wt 97.5 kg (215 lb)   SpO2 91%   BMI 29.99 kg/m²      Intake/Output Summary (Last 24 hours) at 8/12/2020 0757  Last data filed at 8/12/2020 0637      Gross per 24 hour   Intake 1900 ml   Output 190 ml   Net 1710 ml         CV: Regular rate and rhythm  L: normal air entry     Abd: Soft with minimal puncture site tenderness  Rafael-Lipscomb with minimal serosanguineous drainage        LABS:          Results from last 7 days   Lab Units 08/12/20  0439   WBC 10*3/mm3 11.30*   HEMOGLOBIN g/dL 13.3   HEMATOCRIT % 42.7   PLATELETS 10*3/mm3 252           Results from last 7 days   Lab Units 08/11/20  0549   SODIUM mmol/L 139   POTASSIUM mmol/L 4.4   CHLORIDE mmol/L 101   CO2 mmol/L 25.0   BUN mg/dL 12   CREATININE mg/dL 1.50*   CALCIUM mg/dL 10.0   BILIRUBIN mg/dL 0.5   ALK PHOS U/L 97   ALT (SGPT) U/L 29   AST (SGOT) U/L 21   GLUCOSE mg/dL 98           Results from last 7 days   Lab Units 08/11/20  0549   SODIUM mmol/L 139   POTASSIUM mmol/L 4.4   CHLORIDE mmol/L 101   CO2 mmol/L 25.0   BUN mg/dL 12   CREATININE mg/dL 1.50*   GLUCOSE mg/dL 98   CALCIUM mg/dL 10.0            Lab Results   Lab Value Date/Time     LIPASE 32 08/11/2020 0549     LIPASE 22 11/26/2019 2159            Assessment/ Plan: Stable course status post laparoscopic appendectomy secondary to acute phlegmonous appendicitis  Continue with IV antibiotics  Start on diet  Increase activity          Problem List Items Addressed This Visit                 Digestive      Acute appendicitis      Relevant Orders      Tissue Pathology Exam        "     Nervous and Auditory      Acute abdominal pain - Primary                 Renata Conway MD  8/12/2020  07:57

## 2020-08-24 NOTE — PROGRESS NOTES
Terminal ileum serosal tear could not have been avoided due to the extent of chronic inflammation. An incidental occurrence that was addressed appropriately with no consequence.

## 2020-10-01 ENCOUNTER — HOSPITAL ENCOUNTER (OUTPATIENT)
Dept: CT IMAGING | Facility: HOSPITAL | Age: 30
Discharge: HOME | End: 2020-10-01

## 2020-10-01 ENCOUNTER — OFFICE VISIT (OUTPATIENT)
Dept: FAMILY MEDICINE CLINIC | Facility: CLINIC | Age: 30
End: 2020-10-01

## 2020-10-01 ENCOUNTER — LAB (OUTPATIENT)
Dept: LAB | Facility: HOSPITAL | Age: 30
End: 2020-10-01

## 2020-10-01 VITALS
SYSTOLIC BLOOD PRESSURE: 118 MMHG | OXYGEN SATURATION: 98 % | BODY MASS INDEX: 28.14 KG/M2 | DIASTOLIC BLOOD PRESSURE: 72 MMHG | HEIGHT: 71 IN | WEIGHT: 201 LBS | HEART RATE: 80 BPM

## 2020-10-01 DIAGNOSIS — R10.32 LLQ PAIN: ICD-10-CM

## 2020-10-01 DIAGNOSIS — R10.32 LLQ PAIN: Primary | ICD-10-CM

## 2020-10-01 DIAGNOSIS — N28.9 RENAL INSUFFICIENCY: ICD-10-CM

## 2020-10-01 PROBLEM — K36 CHRONIC APPENDICITIS: Status: RESOLVED | Noted: 2019-11-27 | Resolved: 2020-10-01

## 2020-10-01 PROBLEM — R10.9 ACUTE ABDOMINAL PAIN: Status: RESOLVED | Noted: 2020-08-11 | Resolved: 2020-10-01

## 2020-10-01 PROBLEM — R10.31 RIGHT LOWER QUADRANT ABDOMINAL PAIN: Status: RESOLVED | Noted: 2019-11-27 | Resolved: 2020-10-01

## 2020-10-01 LAB
ANION GAP SERPL CALCULATED.3IONS-SCNC: 11 MMOL/L (ref 5–15)
BUN SERPL-MCNC: 16 MG/DL (ref 6–20)
BUN/CREAT SERPL: 12.7 (ref 7–25)
CALCIUM SPEC-SCNC: 10.3 MG/DL (ref 8.6–10.5)
CHLORIDE SERPL-SCNC: 101 MMOL/L (ref 98–107)
CO2 SERPL-SCNC: 27 MMOL/L (ref 22–29)
CREAT SERPL-MCNC: 1.26 MG/DL (ref 0.76–1.27)
GFR SERPL CREATININE-BSD FRML MDRD: 68 ML/MIN/1.73
GLUCOSE SERPL-MCNC: 93 MG/DL (ref 65–99)
POTASSIUM SERPL-SCNC: 4.8 MMOL/L (ref 3.5–5.2)
SODIUM SERPL-SCNC: 139 MMOL/L (ref 136–145)

## 2020-10-01 PROCEDURE — 0 DIATRIZOATE MEGLUMINE & SODIUM PER 1 ML: Performed by: FAMILY MEDICINE

## 2020-10-01 PROCEDURE — 74177 CT ABD & PELVIS W/CONTRAST: CPT

## 2020-10-01 PROCEDURE — 80048 BASIC METABOLIC PNL TOTAL CA: CPT

## 2020-10-01 PROCEDURE — 99214 OFFICE O/P EST MOD 30 MIN: CPT | Performed by: FAMILY MEDICINE

## 2020-10-01 PROCEDURE — 25010000002 IOPAMIDOL 61 % SOLUTION: Performed by: FAMILY MEDICINE

## 2020-10-01 RX ADMIN — DIATRIZOATE MEGLUMINE AND DIATRIZOATE SODIUM 15 ML: 660; 100 LIQUID ORAL; RECTAL at 14:33

## 2020-10-01 RX ADMIN — IOPAMIDOL 95 ML: 612 INJECTION, SOLUTION INTRAVENOUS at 14:33

## 2020-10-01 NOTE — PROGRESS NOTES
Subjective   Chetan Gandhi is a 30 y.o. male.     Chief Complaint   Patient presents with   • Abdominal Pain     Pateint complains of Left Lower quad pain x 2 days        Abdominal Pain  This is a new problem. Episode onset: 2 days. The onset quality is sudden. The problem occurs constantly. The problem has been gradually worsening. The pain is located in the LLQ. The pain is at a severity of 6/10. The pain is moderate. The quality of the pain is sharp, cramping and a sensation of fullness. The abdominal pain does not radiate. Pertinent negatives include no anorexia, arthralgias, belching, constipation, diarrhea, fever, flatus, frequency, headaches, hematochezia, hematuria, melena, myalgias, nausea or vomiting. The pain is aggravated by coughing and movement. The pain is relieved by being still. He has tried acetaminophen for the symptoms. The treatment provided no relief. He had appendectomy in August 2020        Chetan Gandhi presents today for   Chief Complaint   Patient presents with   • Abdominal Pain     Pateint complains of Left Lower quad pain x 2 days      Laparoscopic appendectomy 8/11/2020 with Dr. DEE for acute phlegmonous appendicitis.  He did not follow-up for his TCM visit he reports this pain is distinctly new.  He started working out about a month after his surgery, got a [employer] bike, admits he may have been doing more than he should.    This patient is accompanied by their self who contributes to the history of their care.    The following portions of the patient's history were reviewed and updated as appropriate: allergies, current medications, past family history, past medical history, past social history, past surgical history and problem list.    Active Ambulatory Problems     Diagnosis Date Noted   • Acute appendicitis 11/27/2019   • Renal insufficiency 11/27/2019     Resolved Ambulatory Problems     Diagnosis Date Noted   • Right lower quadrant abdominal pain 11/27/2019   •  "Chronic appendicitis 11/27/2019   • Acute abdominal pain 08/11/2020     No Additional Past Medical History     Past Surgical History:   Procedure Laterality Date   • APPENDECTOMY N/A 8/11/2020    Procedure: APPENDECTOMY LAPAROSCOPIC;  Surgeon: Renata Conway MD;  Location: [facility];  Service: General;  Laterality: N/A;     Family History   Problem Relation Age of Onset   • Hemochromatosis Maternal Grandfather      Social History     Socioeconomic History   • Marital status:      Spouse name: Not on file   • Number of children: Not on file   • Years of education: Not on file   • Highest education level: Not on file   Tobacco Use   • Smoking status: Never Smoker   • Smokeless tobacco: Never Used   Substance and Sexual Activity   • Alcohol use: No     Frequency: Never   • Drug use: No   • Sexual activity: Defer       Review of Systems   Constitutional: Negative for fever.   Gastrointestinal: Positive for abdominal pain. Negative for anorexia, constipation, diarrhea, flatus, hematochezia, melena, nausea and vomiting.   Genitourinary: Negative for frequency and hematuria.   Musculoskeletal: Negative for arthralgias and myalgias.   Neurological: Negative for headaches.     Objective   Blood pressure 118/72, pulse 80, height 180.3 cm (71\"), weight 91.2 kg (201 lb), SpO2 98 %.  Nursing note reviewed  Physical Exam  Const: NAD, A&Ox4, Pleasant, Cooperative  Eyes: EOMI, no conjunctivitis  ENT: No nasal discharge present, neck supple  Cardiac: Regular rate and rhythm, no cyanosis  Resp: Respiratory rate within normal limits, no increased work of breathing, no audible wheezing or retractions noted  GI: No distention or ascites.  No palpable direct abdominal hernia noted.  Tender to palpation left lower quadrant, no inguinal hernia noted  Procedures  Assessment/Plan   Problem List Items Addressed This Visit        Genitourinary    Renal insufficiency    Current Assessment & Plan     Had mild renal insufficiency in " August, will need to repeat before CT scan         Relevant Orders    Basic Metabolic Panel      Other Visit Diagnoses     LLQ pain    -  Primary    Concern for abd wall hernia vs strain    Relevant Orders    CT Abdomen Pelvis With Contrast          See patient diagnoses and orders along with patient instructions for assessment, plan, and changes to care for patient.    There are no Patient Instructions on file for this visit.    No follow-ups on file.    Ambulatory progress note signed and attested to by Juwan Bryson D.O.

## 2020-10-02 ENCOUNTER — TELEPHONE (OUTPATIENT)
Dept: FAMILY MEDICINE CLINIC | Facility: CLINIC | Age: 30
End: 2020-10-02

## 2020-10-02 DIAGNOSIS — R10.32 LLQ PAIN: Primary | ICD-10-CM

## 2020-10-02 RX ORDER — METRONIDAZOLE 500 MG/1
500 TABLET ORAL 2 TIMES DAILY
Qty: 14 TABLET | Refills: 0 | Status: SHIPPED | OUTPATIENT
Start: 2020-10-02 | End: 2020-10-09

## 2020-10-02 RX ORDER — CIPROFLOXACIN 500 MG/1
500 TABLET, FILM COATED ORAL 2 TIMES DAILY
Qty: 14 TABLET | Refills: 0 | Status: SHIPPED | OUTPATIENT
Start: 2020-10-02 | End: 2020-10-09

## 2020-10-02 NOTE — TELEPHONE ENCOUNTER
Communicated results to patient, sent in antibiotics for inflammation/possible fistula.  He will follow-up with surgeon's office on 10/15

## 2020-10-02 NOTE — TELEPHONE ENCOUNTER
Caller: Chetan Gandhi    Relationship: Self    Best call back number: [phone]    Caller requesting test results: SELF    What test was performed: CT SCAN     When was the test performed: 10/01/20    Where was the test performed:     Additional notes:

## 2020-10-09 ENCOUNTER — TELEPHONE (OUTPATIENT)
Dept: FAMILY MEDICINE CLINIC | Facility: CLINIC | Age: 30
End: 2020-10-09

## 2020-10-09 NOTE — TELEPHONE ENCOUNTER
Patient dropped off FMLA forms to be filled out. Placed upstairs in Dr. Bryson folder. Please call with questions at [phone]

## 2020-10-19 ENCOUNTER — TELEPHONE (OUTPATIENT)
Dept: FAMILY MEDICINE CLINIC | Facility: CLINIC | Age: 30
End: 2020-10-19

## 2020-11-05 NOTE — TELEPHONE ENCOUNTER
Attempted to contact, no answer. LVM asking for a return call. I need to clarify some questions with patient before completion.

## 2021-12-14 ENCOUNTER — TELEMEDICINE (OUTPATIENT)
Dept: FAMILY MEDICINE CLINIC | Facility: CLINIC | Age: 31
End: 2021-12-14

## 2021-12-14 DIAGNOSIS — U07.1 COVID-19: Primary | ICD-10-CM

## 2021-12-14 DIAGNOSIS — R06.02 SHORTNESS OF BREATH: ICD-10-CM

## 2021-12-14 PROBLEM — N28.9 RENAL INSUFFICIENCY: Status: RESOLVED | Noted: 2019-11-27 | Resolved: 2021-12-14

## 2021-12-14 PROBLEM — K35.80 ACUTE APPENDICITIS: Status: RESOLVED | Noted: 2019-11-27 | Resolved: 2021-12-14

## 2021-12-14 PROCEDURE — 99214 OFFICE O/P EST MOD 30 MIN: CPT | Performed by: FAMILY MEDICINE

## 2021-12-14 RX ORDER — AZITHROMYCIN 250 MG/1
TABLET, FILM COATED ORAL
Qty: 6 TABLET | Refills: 0 | Status: SHIPPED | OUTPATIENT
Start: 2021-12-14 | End: 2021-12-19

## 2021-12-14 RX ORDER — DEXAMETHASONE 6 MG/1
6 TABLET ORAL
Qty: 14 TABLET | Refills: 0 | Status: SHIPPED | OUTPATIENT
Start: 2021-12-14 | End: 2021-12-28

## 2021-12-14 NOTE — PROGRESS NOTES
Subjective   Chetan Gandhi is a 31 y.o. male.     Chief Complaint   Patient presents with   • Follow-up     dx with COVID 12/3       History of Present Illness     Chetan Gandhi presents today for   Chief Complaint   Patient presents with   • Follow-up     dx with COVID 12/3     Tested positive on 12/3/21. Was feeling better up until the weekend, over the last few days have been feeling worse with chest discomfort, shortness of breath, orthopnea. He had to sleep on the cough last night due to feeling smothered when lying flat in bed. Unvaccinated. He's been using a nasal spray but that's the only OTC he's been taking.    You have chosen to receive care through a telehealth visit.  Do you consent to use a video/audio connection for your medical care today? Yes    The following portions of the patient's history were reviewed and updated as appropriate: allergies, current medications, past family history, past medical history, past social history, past surgical history and problem list.    Active Ambulatory Problems     Diagnosis Date Noted   • COVID-19 positive 12/3/21 12/14/2021     Resolved Ambulatory Problems     Diagnosis Date Noted   • Right lower quadrant abdominal pain 11/27/2019   • Acute appendicitis 11/27/2019   • Chronic appendicitis 11/27/2019   • Renal insufficiency 11/27/2019   • Acute abdominal pain 08/11/2020     No Additional Past Medical History     Past Surgical History:   Procedure Laterality Date   • APPENDECTOMY N/A 8/11/2020    Procedure: APPENDECTOMY LAPAROSCOPIC;  Surgeon: Renata Conway MD;  Location: [facility];  Service: General;  Laterality: N/A;     Family History   Problem Relation Age of Onset   • Hemochromatosis Maternal Grandfather      Social History     Socioeconomic History   • Marital status:    Tobacco Use   • Smoking status: Never Smoker   • Smokeless tobacco: Never Used   Substance and Sexual Activity   • Alcohol use: No   • Drug use: No   •  Sexual activity: Defer       Review of Systems  Review of Systems -  General ROS: negative for - chills, fever or night sweats  Genito-Urinary ROS: no dysuria, trouble voiding, or hematuria    Objective   There were no vitals taken for this visit.  Vitals obtained from patient if available  Physical Exam  Const: Non-toxic appearing, NAD, A&Ox4, Pleasant, Cooperative  Eyes: EOMI, no conjunctivitis  ENT: No copious nasal drainage noted  Cardiac: Regular rate by pulse  Resp: Respiratory rate observed to be within normal limits, no increased work of breathing observed, no audible wheezing or cough noted  Psych: Appropriate mood and behavior.  Procedures  Assessment/Plan   Problem List Items Addressed This Visit        Other    COVID-19 positive 12/3/21 - Primary    Relevant Medications    azithromycin (Zithromax Z-German) 250 MG tablet    dexamethasone (DECADRON) 6 MG tablet    Other Relevant Orders    XR Chest PA & Lateral    CBC & Differential    Basic Metabolic Panel      Other Visit Diagnoses     Shortness of breath        Relevant Orders    XR Chest PA & Lateral    CBC & Differential    Basic Metabolic Panel          See patient diagnoses and orders along with patient instructions for assessment, plan, and changes to care for patient.    This visit was conducted via telemedicine with live video and audio provided through Video Options: MyChart/Zoom at the point of care.    There are no Patient Instructions on file for this visit.    Return in about 2 weeks (around 12/28/2021).    Ambulatory progress note signed and attested to by Juwan Bryson D.O.

## 2021-12-15 ENCOUNTER — TELEPHONE (OUTPATIENT)
Dept: FAMILY MEDICINE CLINIC | Facility: CLINIC | Age: 31
End: 2021-12-15

## 2021-12-15 NOTE — TELEPHONE ENCOUNTER
Caller: Chetan Gandhi    Relationship: Self    Best call back number:[phone]  What form or medical record are you requesting:WORK EXCUSE    Who is requesting this form or medical record from you: MARCO A    How would you like to receive the form or medical records (pick-up, mail, fax): FAX    If fax, what is the fax number: [phone]  If mail, what is the address:   If pick-up, provide patient with address and location details    Timeframe paperwork needed: ASAP    Additional notes: DATES TO INCLUDE 12/03/21-12/27/21

## 2024-12-07 ENCOUNTER — HOSPITAL ENCOUNTER (OUTPATIENT)
Dept: RADIOLOGY | Facility: CLINIC | Age: 34
Discharge: HOME | End: 2024-12-07
Payer: COMMERCIAL

## 2024-12-07 DIAGNOSIS — J22 LOWER RESPIRATORY INFECTION: ICD-10-CM

## 2024-12-13 ENCOUNTER — OFFICE VISIT (OUTPATIENT)
Dept: FAMILY MEDICINE CLINIC | Facility: CLINIC | Age: 34
End: 2024-12-13
Payer: COMMERCIAL

## 2024-12-13 VITALS
WEIGHT: 233.8 LBS | SYSTOLIC BLOOD PRESSURE: 140 MMHG | BODY MASS INDEX: 32.73 KG/M2 | DIASTOLIC BLOOD PRESSURE: 88 MMHG | HEART RATE: 80 BPM | OXYGEN SATURATION: 96 % | HEIGHT: 71 IN

## 2024-12-13 DIAGNOSIS — Z13.29 SCREENING FOR ENDOCRINE DISORDER: ICD-10-CM

## 2024-12-13 DIAGNOSIS — R03.0 ELEVATED BLOOD PRESSURE READING IN OFFICE WITHOUT DIAGNOSIS OF HYPERTENSION: ICD-10-CM

## 2024-12-13 DIAGNOSIS — Z13.6 SCREENING FOR HYPERTENSION: ICD-10-CM

## 2024-12-13 DIAGNOSIS — E66.09 CLASS 1 OBESITY DUE TO EXCESS CALORIES WITH BODY MASS INDEX (BMI) OF 32.0 TO 32.9 IN ADULT, UNSPECIFIED WHETHER SERIOUS COMORBIDITY PRESENT: ICD-10-CM

## 2024-12-13 DIAGNOSIS — Z11.59 ENCOUNTER FOR HEPATITIS C SCREENING TEST FOR LOW RISK PATIENT: ICD-10-CM

## 2024-12-13 DIAGNOSIS — Z13.0 SCREENING FOR DEFICIENCY ANEMIA: ICD-10-CM

## 2024-12-13 DIAGNOSIS — K22.0 ACHALASIA OF ESOPHAGUS: ICD-10-CM

## 2024-12-13 DIAGNOSIS — Z00.00 PREVENTATIVE HEALTH CARE: ICD-10-CM

## 2024-12-13 DIAGNOSIS — Z13.220 SCREENING FOR HYPERLIPIDEMIA: ICD-10-CM

## 2024-12-13 DIAGNOSIS — J01.90 ACUTE RHINOSINUSITIS: Primary | ICD-10-CM

## 2024-12-13 DIAGNOSIS — E66.811 CLASS 1 OBESITY DUE TO EXCESS CALORIES WITH BODY MASS INDEX (BMI) OF 32.0 TO 32.9 IN ADULT, UNSPECIFIED WHETHER SERIOUS COMORBIDITY PRESENT: ICD-10-CM

## 2024-12-13 DIAGNOSIS — Z13.89 SCREENING FOR BLOOD OR PROTEIN IN URINE: ICD-10-CM

## 2024-12-13 PROBLEM — U07.1 COVID-19: Status: RESOLVED | Noted: 2021-12-14 | Resolved: 2024-12-13

## 2024-12-13 PROCEDURE — 99214 OFFICE O/P EST MOD 30 MIN: CPT | Performed by: FAMILY MEDICINE

## 2024-12-13 NOTE — PATIENT INSTRUCTIONS
I would like you to have your labs done about a week prior to your next appointment.  You do not need an appointment, but I would advise to call our office a few days before you plan to have your labs done to confirm that orders are in and active.  We will discuss the results at your next scheduled visit.  -Lab services are available at any [facility] outpatient lab center, including across the street at [address] [place] [address]

## 2024-12-13 NOTE — PROGRESS NOTES
Established Patient Office Visit      Patient Name: Chetan Gandhi  : 1990   MRN: [id number]   Care Team: Patient Care Team:  Juwan Bryson DO as PCP - General (Family Medicine)    Chief Complaint:    Chief Complaint   Patient presents with    Cough     Pt states he is here for a follow up from  on        History of Present Illness: Chetan Gandhi is a 34 y.o. male who is here today for chief complaint.    HPI    He has not been seen for a visit since 2021, just under the 3-year renan.  That was for a telehealth visit for positive home COVID test.  He has not been seen physically in the office since 2020.    He is here today for follow-up from urgent care for cough.  He was seen on  and prescribed Augmentin for 10 days for acute rhinosinusitis.  He was also provided a prescription for albuterol inhaler.  Chest x-ray at urgent care was unremarkable.    Has had some sporadic difficulty swallowing the last couple of years. Happens ~once per week, no significant change in the last couple years. It did get better when he changed up his diet to be a little healthier earlier this year, came back when he returned to previous diet.    This patient is accompanied by their self who contributes to the history of their care.    The following portions of the patient's history were reviewed and updated as appropriate: allergies, current medications, past family history, past medical history, past social history, past surgical history and problem list.    Subjective      Review of Systems:   Review of Systems - See HPI    Past Medical History:   Past Medical History:   Diagnosis Date    Acute appendicitis 2019    COVID-19 positive 12/3/21 2021       Past Surgical History:   Past Surgical History:   Procedure Laterality Date    APPENDECTOMY N/A 2020    Procedure: APPENDECTOMY LAPAROSCOPIC;  Surgeon: Renata Conway MD;  Location: [facility];  Service:  "General;  Laterality: N/A;       Family History:   Family History   Problem Relation Age of Onset    Hemochromatosis Maternal Grandfather        Social History:   Social History     Socioeconomic History    Marital status:    Tobacco Use    Smoking status: Never    Smokeless tobacco: Never   Vaping Use    Vaping status: Never Used   Substance and Sexual Activity    Alcohol use: No    Drug use: No    Sexual activity: Defer       Tobacco History:   Social History     Tobacco Use   Smoking Status Never   Smokeless Tobacco Never       Medications:   Outpatient Medications Prior to Visit   Medication Sig Dispense Refill    albuterol sulfate  (90 Base) MCG/ACT inhaler Inhale 2 puffs Every 4 (Four) Hours As Needed for Wheezing or Shortness of Air for up to 10 days. 6.7 g 0    amoxicillin-clavulanate (AUGMENTIN) 875-125 MG per tablet Take 1 tablet by mouth 2 (Two) Times a Day for 10 days. 20 tablet 0     No facility-administered medications prior to visit.        Allergies:   No Known Allergies    Objective   Objective     Physical Exam:  Vital Signs:   Vitals:    12/13/24 1021   BP: 140/88   Pulse: 80   SpO2: 96%   Weight: 106 kg (233 lb 12.8 oz)   Height: 180.3 cm (70.98\")     Body mass index is 32.62 kg/m².     Physical Exam  Nursing note reviewed  Const: NAD, A&Ox4, Pleasant, Cooperative  Eyes: EOMI, no conjunctivitis  ENT: No nasal discharge present, neck supple  Cardiac: Regular rate and rhythm, no cyanosis  Resp: Respiratory rate within normal limits, no increased work of breathing, no audible wheezing or retractions noted  GI: No distention or ascites  MSK: Motor and sensation grossly intact in bilateral upper extremities  Neurologic: CN II-XII grossly intact  Psych: Appropriate mood and behavior.  Skin: Warm, dry  Procedures/Radiology     Procedures  XR Chest 2 View    Result Date: 12/7/2024  No acute cardiopulmonary abnormality. Electronically Signed: Santa Ray MD  12/7/2024 4:22 PM EST  " Workstation ID: [id number]      Assessment & Plan   Assessment / Plan      Assessment/Plan:   Problems Addressed This Visit  Diagnoses and all orders for this visit:    1. Acute rhinosinusitis (Primary)    2. Preventative health care  -     Lipid Panel; Future  -     Hemoglobin A1c; Future  -     Comprehensive Metabolic Panel; Future  -     CBC & Differential; Future  -     Urinalysis With Microscopic If Indicated (No Culture) - Urine, Clean Catch; Future  -     TSH Rfx On Abnormal To Free T4; Future  -     Hepatitis C Antibody; Future  -     Uric Acid; Future  -     Microalbumin / Creatinine Urine Ratio - Urine, Clean Catch; Future    3. Elevated blood pressure reading in office without diagnosis of hypertension  Comments:  Acutely sick, was also on steroid for sinusitis. Recheck at annual  Orders:  -     Lipid Panel; Future  -     Hemoglobin A1c; Future  -     Comprehensive Metabolic Panel; Future  -     CBC & Differential; Future  -     Urinalysis With Microscopic If Indicated (No Culture) - Urine, Clean Catch; Future  -     TSH Rfx On Abnormal To Free T4; Future  -     Hepatitis C Antibody; Future  -     Uric Acid; Future  -     Microalbumin / Creatinine Urine Ratio - Urine, Clean Catch; Future    4. Class 1 obesity due to excess calories with body mass index (BMI) of 32.0 to 32.9 in adult, unspecified whether serious comorbidity present  -     Lipid Panel; Future  -     Hemoglobin A1c; Future  -     Comprehensive Metabolic Panel; Future  -     CBC & Differential; Future  -     Urinalysis With Microscopic If Indicated (No Culture) - Urine, Clean Catch; Future  -     TSH Rfx On Abnormal To Free T4; Future  -     Hepatitis C Antibody; Future  -     Uric Acid; Future  -     Microalbumin / Creatinine Urine Ratio - Urine, Clean Catch; Future    5. Screening for hyperlipidemia  -     Lipid Panel; Future    6. Screening for endocrine disorder  -     Hemoglobin A1c; Future  -     Comprehensive Metabolic Panel; Future  -      TSH Rfx On Abnormal To Free T4; Future    7. Screening for deficiency anemia  -     CBC & Differential; Future    8. Screening for blood or protein in urine  -     Urinalysis With Microscopic If Indicated (No Culture) - Urine, Clean Catch; Future  -     Microalbumin / Creatinine Urine Ratio - Urine, Clean Catch; Future    9. Encounter for hepatitis C screening test for low risk patient  -     Hepatitis C Antibody; Future    10. Screening for hypertension  -     Uric Acid; Future    11. Achalasia of esophagus  Comments:  Intermittent, improved with healthy diet and weight loss previously.  Recommended lifestyle changes, PPI if needed.      Problem List Items Addressed This Visit    None  Visit Diagnoses       Acute rhinosinusitis    -  Primary    Preventative health care        Relevant Orders    Lipid Panel    Hemoglobin A1c    Comprehensive Metabolic Panel    CBC & Differential    Urinalysis With Microscopic If Indicated (No Culture) - Urine, Clean Catch    TSH Rfx On Abnormal To Free T4    Hepatitis C Antibody    Uric Acid    Microalbumin / Creatinine Urine Ratio - Urine, Clean Catch    Elevated blood pressure reading in office without diagnosis of hypertension        Acutely sick, was also on steroid for sinusitis. Recheck at annual    Relevant Orders    Lipid Panel    Hemoglobin A1c    Comprehensive Metabolic Panel    CBC & Differential    Urinalysis With Microscopic If Indicated (No Culture) - Urine, Clean Catch    TSH Rfx On Abnormal To Free T4    Hepatitis C Antibody    Uric Acid    Microalbumin / Creatinine Urine Ratio - Urine, Clean Catch    Class 1 obesity due to excess calories with body mass index (BMI) of 32.0 to 32.9 in adult, unspecified whether serious comorbidity present        Relevant Orders    Lipid Panel    Hemoglobin A1c    Comprehensive Metabolic Panel    CBC & Differential    Urinalysis With Microscopic If Indicated (No Culture) - Urine, Clean Catch    TSH Rfx On Abnormal To Free T4     Hepatitis C Antibody    Uric Acid    Microalbumin / Creatinine Urine Ratio - Urine, Clean Catch    Screening for hyperlipidemia        Relevant Orders    Lipid Panel    Screening for endocrine disorder        Relevant Orders    Hemoglobin A1c    Comprehensive Metabolic Panel    TSH Rfx On Abnormal To Free T4    Screening for deficiency anemia        Relevant Orders    CBC & Differential    Screening for blood or protein in urine        Relevant Orders    Urinalysis With Microscopic If Indicated (No Culture) - Urine, Clean Catch    Microalbumin / Creatinine Urine Ratio - Urine, Clean Catch    Encounter for hepatitis C screening test for low risk patient        Relevant Orders    Hepatitis C Antibody    Screening for hypertension        Relevant Orders    Uric Acid    Achalasia of esophagus        Intermittent, improved with healthy diet and weight loss previously.  Recommended lifestyle changes, PPI if needed.          Orders Placed This Encounter   Procedures    Lipid Panel     Standing Status:   Future     Standing Expiration Date:   12/13/2025     Order Specific Question:   Release to patient     Answer:   Routine Release [[id number]]    Hemoglobin A1c     Standing Status:   Future     Standing Expiration Date:   12/13/2025     Order Specific Question:   Release to patient     Answer:   Routine Release [[id number]]    Comprehensive Metabolic Panel     Standing Status:   Future     Standing Expiration Date:   12/13/2025     Order Specific Question:   Release to patient     Answer:   Routine Release [[id number]]    Urinalysis With Microscopic If Indicated (No Culture) - Urine, Clean Catch     Standing Status:   Future     Standing Expiration Date:   12/13/2025     Order Specific Question:   Release to patient     Answer:   Routine Release [[id number]]    TSH Rfx On Abnormal To Free T4     Standing Status:   Future     Standing Expiration Date:   12/13/2025     Order Specific Question:   Release to patient      Answer:   Routine Release [[id number]]    Hepatitis C Antibody     Standing Status:   Future     Standing Expiration Date:   12/13/2025     Order Specific Question:   Release to patient     Answer:   Routine Release [[id number]]    Uric Acid     Standing Status:   Future     Standing Expiration Date:   12/13/2025     Order Specific Question:   Release to patient     Answer:   Routine Release [[id number]]    Microalbumin / Creatinine Urine Ratio - Urine, Clean Catch     Standing Status:   Future     Standing Expiration Date:   12/13/2025     Order Specific Question:   Release to patient     Answer:   Routine Release [[id number]]    CBC & Differential     Standing Status:   Future     Standing Expiration Date:   12/13/2025     Order Specific Question:   Manual Differential     Answer:   No     Order Specific Question:   Release to patient     Answer:   Routine Release [[id number]]     FMLA paperwork filled out for patient today, he was absent from work from 12/2, returning today 12/13/2024.    Patient Instructions   I would like you to have your labs done about a week prior to your next appointment.  You do not need an appointment, but I would advise to call our office a few days before you plan to have your labs done to confirm that orders are in and active.  We will discuss the results at your next scheduled visit.  -Lab services are available at any [facility] outpatient lab center, including across the street at [address] [place] [address] Dr     Follow Up:   Return in about 4 months (around 4/13/2025) for Annual, (fasting blood work 1 week prior to appt).    DO ELISSA Parmar RD  [facility]  [address]  [facility] [address]  Fax [phone]  Phone [phone]    Disclaimer to patients: The 21st Century Cares Act makes medical notes like these available to patients in the interest of transparency. However, please be advised that this is still a  medical document. It is intended as [id number]-[id number] communication. Many sections may include medical language or jargon, abbreviations, and additional verbiage that are unfamiliar or confusing. In some ways it may come across as blunt, direct, or may be summarized in order to clearly and concisely communicate the most crucial information to medical professionals. It may also include mentions of conditions that are unlikely but considered as part of the differential diagnosis, including serious disorders. These are not always discussed at length at the time of appointment because their likelihood is so low, but may be included in a medical note to make it clear what has been considered and/or ruled out as part of a work-up. Medical documents are intended to carry relevant information, facts as evident, and the personal clinical opinion of the physician. If you have any questions regarding this medical document, please bring them to the attention of the physician at your next scheduled appointment.

## 2024-12-16 ENCOUNTER — TELEPHONE (OUTPATIENT)
Dept: FAMILY MEDICINE CLINIC | Facility: CLINIC | Age: 34
End: 2024-12-16
Payer: COMMERCIAL

## 2025-02-23 ENCOUNTER — HOSPITAL ENCOUNTER (OUTPATIENT)
Facility: HOSPITAL | Age: 35
Discharge: HOME | End: 2025-02-24
Attending: EMERGENCY MEDICINE | Admitting: STUDENT IN AN ORGANIZED HEALTH CARE EDUCATION/TRAINING PROGRAM
Payer: COMMERCIAL

## 2025-02-23 ENCOUNTER — APPOINTMENT (OUTPATIENT)
Dept: GENERAL RADIOLOGY | Facility: HOSPITAL | Age: 35
End: 2025-02-23
Payer: COMMERCIAL

## 2025-02-23 DIAGNOSIS — N17.9 ACUTE KIDNEY INJURY: ICD-10-CM

## 2025-02-23 DIAGNOSIS — T18.128A FOOD IMPACTION OF ESOPHAGUS, INITIAL ENCOUNTER: Primary | ICD-10-CM

## 2025-02-23 DIAGNOSIS — R13.10 DYSPHAGIA: ICD-10-CM
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
## 2025-02-23 LAB
BASOPHILS # BLD AUTO: 0.07 10*3/MM3 (ref 0–0.2)
BASOPHILS NFR BLD AUTO: 0.8 % (ref 0–1.5)
DEPRECATED RDW RBC AUTO: 39.9 FL (ref 37–54)
EOSINOPHIL # BLD AUTO: 0.2 10*3/MM3 (ref 0–0.4)
EOSINOPHIL NFR BLD AUTO: 2.2 % (ref 0.3–6.2)
ERYTHROCYTE [DISTWIDTH] IN BLOOD BY AUTOMATED COUNT: 13.5 % (ref 12.3–15.4)
HCT VFR BLD AUTO: 43.7 % (ref 37.5–51)
HGB BLD-MCNC: 14.4 G/DL (ref 13–17.7)
IMM GRANULOCYTES # BLD AUTO: 0.06 10*3/MM3 (ref 0–0.05)
IMM GRANULOCYTES NFR BLD AUTO: 0.7 % (ref 0–0.5)
LYMPHOCYTES # BLD AUTO: 1.56 10*3/MM3 (ref 0.7–3.1)
LYMPHOCYTES NFR BLD AUTO: 17.3 % (ref 19.6–45.3)
MCH RBC QN AUTO: 26.9 PG (ref 26.6–33)
MCHC RBC AUTO-ENTMCNC: 33 G/DL (ref 31.5–35.7)
MCV RBC AUTO: 81.7 FL (ref 79–97)
MONOCYTES # BLD AUTO: 0.51 10*3/MM3 (ref 0.1–0.9)
MONOCYTES NFR BLD AUTO: 5.7 % (ref 5–12)
NEUTROPHILS NFR BLD AUTO: 6.6 10*3/MM3 (ref 1.7–7)
NEUTROPHILS NFR BLD AUTO: 73.3 % (ref 42.7–76)
NRBC BLD AUTO-RTO: 0 /100 WBC (ref 0–0.2)
PLATELET # BLD AUTO: 266 10*3/MM3 (ref 140–450)
PMV BLD AUTO: 11.1 FL (ref 6–12)
RBC # BLD AUTO: 5.35 10*6/MM3 (ref 4.14–5.8)
WBC NRBC COR # BLD AUTO: 9 10*3/MM3 (ref 3.4–10.8)

## 2025-02-23 PROCEDURE — 71045 X-RAY EXAM CHEST 1 VIEW: CPT

## 2025-02-23 PROCEDURE — 83690 ASSAY OF LIPASE: CPT | Performed by: EMERGENCY MEDICINE

## 2025-02-23 PROCEDURE — 85025 COMPLETE CBC W/AUTO DIFF WBC: CPT | Performed by: EMERGENCY MEDICINE

## 2025-02-23 PROCEDURE — 36415 COLL VENOUS BLD VENIPUNCTURE: CPT

## 2025-02-23 PROCEDURE — 99285 EMERGENCY DEPT VISIT HI MDM: CPT

## 2025-02-23 PROCEDURE — 80053 COMPREHEN METABOLIC PANEL: CPT | Performed by: EMERGENCY MEDICINE

## 2025-02-23 RX ORDER — DIAZEPAM 10 MG/2ML
5 INJECTION, SOLUTION INTRAMUSCULAR; INTRAVENOUS ONCE
Status: COMPLETED | OUTPATIENT
Start: 2025-02-23 | End: 2025-02-24

## 2025-02-23 RX ORDER — NITROGLYCERIN 0.4 MG/1
0.4 TABLET SUBLINGUAL
Status: DISCONTINUED | OUTPATIENT
Start: 2025-02-23 | End: 2025-02-24 | Stop reason: HOSPADM

## 2025-02-23 RX ORDER — IBUPROFEN 600 MG/1
1 TABLET ORAL ONCE
Status: COMPLETED | OUTPATIENT
Start: 2025-02-23 | End: 2025-02-24

## 2025-02-23 RX ORDER — SODIUM CHLORIDE 0.9 % (FLUSH) 0.9 %
10 SYRINGE (ML) INJECTION AS NEEDED
Status: DISCONTINUED | OUTPATIENT
Start: 2025-02-23 | End: 2025-02-24 | Stop reason: HOSPADM

## 2025-02-24 ENCOUNTER — ANESTHESIA (OUTPATIENT)
Dept: GASTROENTEROLOGY | Facility: HOSPITAL | Age: 35
End: 2025-02-24
Payer: COMMERCIAL

## 2025-02-24 ENCOUNTER — ANESTHESIA EVENT (OUTPATIENT)
Dept: GASTROENTEROLOGY | Facility: HOSPITAL | Age: 35
End: 2025-02-24
Payer: COMMERCIAL

## 2025-02-24 ENCOUNTER — READMISSION MANAGEMENT (OUTPATIENT)
Dept: CALL CENTER | Facility: HOSPITAL | Age: 35
End: 2025-02-24
Payer: COMMERCIAL

## 2025-02-24 VITALS
BODY MASS INDEX: 32.72 KG/M2 | HEIGHT: 71 IN | RESPIRATION RATE: 20 BRPM | SYSTOLIC BLOOD PRESSURE: 129 MMHG | TEMPERATURE: 97 F | DIASTOLIC BLOOD PRESSURE: 81 MMHG | WEIGHT: 233.69 LBS | HEART RATE: 77 BPM | OXYGEN SATURATION: 94 %
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
## 2025-02-24 PROBLEM — T18.128A FOOD IMPACTION OF ESOPHAGUS: Status: RESOLVED | Noted: 2025-02-24 | Resolved: 2025-02-24
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
## 2025-02-24 PROBLEM — T18.128A FOOD IMPACTION OF ESOPHAGUS: Status: ACTIVE | Noted: 2025-02-24

## 2025-02-24 LAB
ALBUMIN SERPL-MCNC: 4.4 G/DL (ref 3.5–5.2)
ALBUMIN SERPL-MCNC: 4.5 G/DL (ref 3.5–5.2)
ALBUMIN/GLOB SERPL: 1.6 G/DL
ALBUMIN/GLOB SERPL: 1.6 G/DL
ALP SERPL-CCNC: 81 U/L (ref 39–117)
ALP SERPL-CCNC: 82 U/L (ref 39–117)
ALT SERPL W P-5'-P-CCNC: 32 U/L (ref 1–41)
ALT SERPL W P-5'-P-CCNC: 38 U/L (ref 1–41)
ANION GAP SERPL CALCULATED.3IONS-SCNC: 11 MMOL/L (ref 5–15)
ANION GAP SERPL CALCULATED.3IONS-SCNC: 12 MMOL/L (ref 5–15)
AST SERPL-CCNC: 24 U/L (ref 1–40)
AST SERPL-CCNC: 26 U/L (ref 1–40)
BASOPHILS # BLD AUTO: 0.05 10*3/MM3 (ref 0–0.2)
BASOPHILS NFR BLD AUTO: 0.5 % (ref 0–1.5)
BILIRUB SERPL-MCNC: 0.3 MG/DL (ref 0–1.2)
BILIRUB SERPL-MCNC: 0.4 MG/DL (ref 0–1.2)
BUN SERPL-MCNC: 17 MG/DL (ref 6–20)
BUN SERPL-MCNC: 18 MG/DL (ref 6–20)
BUN/CREAT SERPL: 14 (ref 7–25)
BUN/CREAT SERPL: 15 (ref 7–25)
CALCIUM SPEC-SCNC: 9.1 MG/DL (ref 8.6–10.5)
CALCIUM SPEC-SCNC: 9.6 MG/DL (ref 8.6–10.5)
CHLORIDE SERPL-SCNC: 103 MMOL/L (ref 98–107)
CHLORIDE SERPL-SCNC: 104 MMOL/L (ref 98–107)
CO2 SERPL-SCNC: 23 MMOL/L (ref 22–29)
CO2 SERPL-SCNC: 24 MMOL/L (ref 22–29)
CREAT SERPL-MCNC: 1.13 MG/DL (ref 0.76–1.27)
CREAT SERPL-MCNC: 1.29 MG/DL (ref 0.76–1.27)
DEPRECATED RDW RBC AUTO: 38.6 FL (ref 37–54)
EGFRCR SERPLBLD CKD-EPI 2021: 75.1 ML/MIN/1.73
EGFRCR SERPLBLD CKD-EPI 2021: 88 ML/MIN/1.73
EOSINOPHIL # BLD AUTO: 0.04 10*3/MM3 (ref 0–0.4)
EOSINOPHIL NFR BLD AUTO: 0.4 % (ref 0.3–6.2)
ERYTHROCYTE [DISTWIDTH] IN BLOOD BY AUTOMATED COUNT: 13.4 % (ref 12.3–15.4)
GLOBULIN UR ELPH-MCNC: 2.8 GM/DL
GLOBULIN UR ELPH-MCNC: 2.8 GM/DL
GLUCOSE SERPL-MCNC: 101 MG/DL (ref 65–99)
GLUCOSE SERPL-MCNC: 110 MG/DL (ref 65–99)
HCT VFR BLD AUTO: 42.8 % (ref 37.5–51)
HGB BLD-MCNC: 14.1 G/DL (ref 13–17.7)
IMM GRANULOCYTES # BLD AUTO: 0.04 10*3/MM3 (ref 0–0.05)
IMM GRANULOCYTES NFR BLD AUTO: 0.4 % (ref 0–0.5)
INR PPP: 1 (ref 0.89–1.12)
LIPASE SERPL-CCNC: 34 U/L (ref 13–60)
LYMPHOCYTES # BLD AUTO: 0.95 10*3/MM3 (ref 0.7–3.1)
LYMPHOCYTES NFR BLD AUTO: 10.1 % (ref 19.6–45.3)
MAGNESIUM SERPL-MCNC: 2.2 MG/DL (ref 1.6–2.6)
MCH RBC QN AUTO: 26.4 PG (ref 26.6–33)
MCHC RBC AUTO-ENTMCNC: 32.9 G/DL (ref 31.5–35.7)
MCV RBC AUTO: 80 FL (ref 79–97)
MONOCYTES # BLD AUTO: 0.42 10*3/MM3 (ref 0.1–0.9)
MONOCYTES NFR BLD AUTO: 4.5 % (ref 5–12)
NEUTROPHILS NFR BLD AUTO: 7.9 10*3/MM3 (ref 1.7–7)
NEUTROPHILS NFR BLD AUTO: 84.1 % (ref 42.7–76)
NRBC BLD AUTO-RTO: 0 /100 WBC (ref 0–0.2)
PLATELET # BLD AUTO: 268 10*3/MM3 (ref 140–450)
PMV BLD AUTO: 10.8 FL (ref 6–12)
POTASSIUM SERPL-SCNC: 3.9 MMOL/L (ref 3.5–5.2)
POTASSIUM SERPL-SCNC: 4.3 MMOL/L (ref 3.5–5.2)
PROT SERPL-MCNC: 7.2 G/DL (ref 6–8.5)
PROT SERPL-MCNC: 7.3 G/DL (ref 6–8.5)
PROTHROMBIN TIME: 13.3 SECONDS (ref 12.2–14.5)
RBC # BLD AUTO: 5.35 10*6/MM3 (ref 4.14–5.8)
SODIUM SERPL-SCNC: 138 MMOL/L (ref 136–145)
SODIUM SERPL-SCNC: 139 MMOL/L (ref 136–145)
WBC NRBC COR # BLD AUTO: 9.4 10*3/MM3 (ref 3.4–10.8)

## 2025-02-24 PROCEDURE — 25010000002 LIDOCAINE PF 1% 1 % SOLUTION: Performed by: NURSE ANESTHETIST, CERTIFIED REGISTERED

## 2025-02-24 PROCEDURE — 43239 EGD BIOPSY SINGLE/MULTIPLE: CPT | Performed by: INTERNAL MEDICINE

## 2025-02-24 PROCEDURE — 25010000002 PROPOFOL 10 MG/ML EMULSION: Performed by: NURSE ANESTHETIST, CERTIFIED REGISTERED

## 2025-02-24 PROCEDURE — 88305 TISSUE EXAM BY PATHOLOGIST: CPT | Performed by: INTERNAL MEDICINE

## 2025-02-24 PROCEDURE — 85025 COMPLETE CBC W/AUTO DIFF WBC: CPT | Performed by: INTERNAL MEDICINE

## 2025-02-24 PROCEDURE — 25810000003 SODIUM CHLORIDE 0.9 % SOLUTION: Performed by: EMERGENCY MEDICINE

## 2025-02-24 PROCEDURE — 25010000002 DIAZEPAM PER 5 MG: Performed by: EMERGENCY MEDICINE

## 2025-02-24 PROCEDURE — 25010000002 MIDAZOLAM PER 1 MG: Performed by: INTERNAL MEDICINE

## 2025-02-24 PROCEDURE — 96374 THER/PROPH/DIAG INJ IV PUSH: CPT

## 2025-02-24 PROCEDURE — 43247 EGD REMOVE FOREIGN BODY: CPT | Performed by: INTERNAL MEDICINE

## 2025-02-24 PROCEDURE — 99236 HOSP IP/OBS SAME DATE HI 85: CPT | Performed by: STUDENT IN AN ORGANIZED HEALTH CARE EDUCATION/TRAINING PROGRAM

## 2025-02-24 PROCEDURE — 25810000003 LACTATED RINGERS PER 1000 ML: Performed by: NURSE ANESTHETIST, CERTIFIED REGISTERED

## 2025-02-24 PROCEDURE — 96372 THER/PROPH/DIAG INJ SC/IM: CPT

## 2025-02-24 PROCEDURE — 80053 COMPREHEN METABOLIC PANEL: CPT | Performed by: INTERNAL MEDICINE

## 2025-02-24 PROCEDURE — 25010000002 DEXAMETHASONE PER 1 MG: Performed by: NURSE ANESTHETIST, CERTIFIED REGISTERED

## 2025-02-24 PROCEDURE — 85610 PROTHROMBIN TIME: CPT | Performed by: INTERNAL MEDICINE

## 2025-02-24 PROCEDURE — 25810000003 SODIUM CHLORIDE 0.9 % SOLUTION: Performed by: INTERNAL MEDICINE

## 2025-02-24 PROCEDURE — C1726 CATH, BAL DIL, NON-VASCULAR: HCPCS | Performed by: INTERNAL MEDICINE

## 2025-02-24 PROCEDURE — 25010000002 PROCHLORPERAZINE 10 MG/2ML SOLUTION: Performed by: EMERGENCY MEDICINE

## 2025-02-24 PROCEDURE — 83735 ASSAY OF MAGNESIUM: CPT | Performed by: INTERNAL MEDICINE

## 2025-02-24 PROCEDURE — 25010000002 NEOSTIGMINE 10 MG/10ML SOLUTION: Performed by: NURSE ANESTHETIST, CERTIFIED REGISTERED

## 2025-02-24 PROCEDURE — 25010000002 GLYCOPYRROLATE 1 MG/5ML SOLUTION: Performed by: NURSE ANESTHETIST, CERTIFIED REGISTERED

## 2025-02-24 PROCEDURE — 25010000002 ONDANSETRON PER 1 MG: Performed by: NURSE ANESTHETIST, CERTIFIED REGISTERED

## 2025-02-24 PROCEDURE — 96375 TX/PRO/DX INJ NEW DRUG ADDON: CPT

## 2025-02-24 PROCEDURE — 43249 ESOPH EGD DILATION <30 MM: CPT | Performed by: INTERNAL MEDICINE

## 2025-02-24 PROCEDURE — G0378 HOSPITAL OBSERVATION PER HR: HCPCS

## 2025-02-24 PROCEDURE — 99284 EMERGENCY DEPT VISIT MOD MDM: CPT | Performed by: NURSE PRACTITIONER

## 2025-02-24 PROCEDURE — 25010000002 GLUCAGON (RDNA) PER 1 MG: Performed by: EMERGENCY MEDICINE

## 2025-02-24 RX ORDER — GLYCOPYRROLATE 0.2 MG/ML
INJECTION INTRAMUSCULAR; INTRAVENOUS AS NEEDED
Status: DISCONTINUED | OUTPATIENT
Start: 2025-02-24 | End: 2025-02-24 | Stop reason: SURG

## 2025-02-24 RX ORDER — PROCHLORPERAZINE EDISYLATE 5 MG/ML
10 INJECTION INTRAMUSCULAR; INTRAVENOUS ONCE
Status: COMPLETED | OUTPATIENT
Start: 2025-02-24 | End: 2025-02-24

## 2025-02-24 RX ORDER — ONDANSETRON 2 MG/ML
4 INJECTION INTRAMUSCULAR; INTRAVENOUS ONCE AS NEEDED
Status: DISCONTINUED | OUTPATIENT
Start: 2025-02-24 | End: 2025-02-24 | Stop reason: HOSPADM

## 2025-02-24 RX ORDER — ONDANSETRON 2 MG/ML
4 INJECTION INTRAMUSCULAR; INTRAVENOUS EVERY 6 HOURS PRN
Status: DISCONTINUED | OUTPATIENT
Start: 2025-02-24 | End: 2025-02-24 | Stop reason: HOSPADM

## 2025-02-24 RX ORDER — ROCURONIUM BROMIDE 10 MG/ML
INJECTION, SOLUTION INTRAVENOUS AS NEEDED
Status: DISCONTINUED | OUTPATIENT
Start: 2025-02-24 | End: 2025-02-24 | Stop reason: SURG

## 2025-02-24 RX ORDER — SODIUM CHLORIDE, SODIUM LACTATE, POTASSIUM CHLORIDE, CALCIUM CHLORIDE 600; 310; 30; 20 MG/100ML; MG/100ML; MG/100ML; MG/100ML
INJECTION, SOLUTION INTRAVENOUS CONTINUOUS PRN
Status: DISCONTINUED | OUTPATIENT
Start: 2025-02-24 | End: 2025-02-24 | Stop reason: SURG

## 2025-02-24 RX ORDER — NALOXONE HCL 0.4 MG/ML
0.4 VIAL (ML) INJECTION
Status: DISCONTINUED | OUTPATIENT
Start: 2025-02-24 | End: 2025-02-24 | Stop reason: HOSPADM

## 2025-02-24 RX ORDER — DEXAMETHASONE SODIUM PHOSPHATE 4 MG/ML
INJECTION, SOLUTION INTRA-ARTICULAR; INTRALESIONAL; INTRAMUSCULAR; INTRAVENOUS; SOFT TISSUE AS NEEDED
Status: DISCONTINUED | OUTPATIENT
Start: 2025-02-24 | End: 2025-02-24 | Stop reason: SURG

## 2025-02-24 RX ORDER — HYDROMORPHONE HYDROCHLORIDE 1 MG/ML
0.5 INJECTION, SOLUTION INTRAMUSCULAR; INTRAVENOUS; SUBCUTANEOUS
Status: DISCONTINUED | OUTPATIENT
Start: 2025-02-24 | End: 2025-02-24 | Stop reason: HOSPADM

## 2025-02-24 RX ORDER — LIDOCAINE HYDROCHLORIDE 10 MG/ML
INJECTION, SOLUTION EPIDURAL; INFILTRATION; INTRACAUDAL; PERINEURAL AS NEEDED
Status: DISCONTINUED | OUTPATIENT
Start: 2025-02-24 | End: 2025-02-24 | Stop reason: SURG

## 2025-02-24 RX ORDER — NEOSTIGMINE METHYLSULFATE 1 MG/ML
INJECTION INTRAVENOUS AS NEEDED
Status: DISCONTINUED | OUTPATIENT
Start: 2025-02-24 | End: 2025-02-24 | Stop reason: SURG

## 2025-02-24 RX ORDER — SUCCINYLCHOLINE/SOD CL,ISO/PF 200MG/10ML
SYRINGE (ML) INTRAVENOUS AS NEEDED
Status: DISCONTINUED | OUTPATIENT
Start: 2025-02-24 | End: 2025-02-24 | Stop reason: SURG

## 2025-02-24 RX ORDER — ONDANSETRON 2 MG/ML
INJECTION INTRAMUSCULAR; INTRAVENOUS AS NEEDED
Status: DISCONTINUED | OUTPATIENT
Start: 2025-02-24 | End: 2025-02-24 | Stop reason: SURG

## 2025-02-24 RX ORDER — FENTANYL CITRATE 50 UG/ML
50 INJECTION, SOLUTION INTRAMUSCULAR; INTRAVENOUS
Status: DISCONTINUED | OUTPATIENT
Start: 2025-02-24 | End: 2025-02-24 | Stop reason: HOSPADM

## 2025-02-24 RX ORDER — PROPOFOL 10 MG/ML
VIAL (ML) INTRAVENOUS AS NEEDED
Status: DISCONTINUED | OUTPATIENT
Start: 2025-02-24 | End: 2025-02-24 | Stop reason: SURG

## 2025-02-24 RX ORDER — SODIUM CHLORIDE 9 MG/ML
100 INJECTION, SOLUTION INTRAVENOUS CONTINUOUS
Status: DISCONTINUED | OUTPATIENT
Start: 2025-02-24 | End: 2025-02-24 | Stop reason: HOSPADM

## 2025-02-24 RX ORDER — NITROGLYCERIN 0.4 MG/1
0.4 TABLET SUBLINGUAL
Status: DISCONTINUED | OUTPATIENT
Start: 2025-02-24 | End: 2025-02-24 | Stop reason: HOSPADM

## 2025-02-24 RX ORDER — MORPHINE SULFATE 2 MG/ML
1 INJECTION, SOLUTION INTRAMUSCULAR; INTRAVENOUS EVERY 4 HOURS PRN
Status: DISCONTINUED | OUTPATIENT
Start: 2025-02-24 | End: 2025-02-24 | Stop reason: HOSPADM

## 2025-02-24 RX ORDER — PANTOPRAZOLE SODIUM 40 MG/1
40 TABLET, DELAYED RELEASE ORAL
Qty: 120 TABLET | Refills: 0 | Status: SHIPPED | OUTPATIENT
Start: 2025-02-24

## 2025-02-24 RX ORDER — SODIUM CHLORIDE 0.9 % (FLUSH) 0.9 %
10 SYRINGE (ML) INJECTION AS NEEDED
Status: DISCONTINUED | OUTPATIENT
Start: 2025-02-24 | End: 2025-02-24 | Stop reason: HOSPADM

## 2025-02-24 RX ORDER — MIDAZOLAM HYDROCHLORIDE 1 MG/ML
2 INJECTION, SOLUTION INTRAMUSCULAR; INTRAVENOUS EVERY 6 HOURS PRN
Status: DISCONTINUED | OUTPATIENT
Start: 2025-02-24 | End: 2025-02-24 | Stop reason: HOSPADM

## 2025-02-24 RX ORDER — PANTOPRAZOLE SODIUM 40 MG/1
40 TABLET, DELAYED RELEASE ORAL
Status: DISCONTINUED | OUTPATIENT
Start: 2025-02-24 | End: 2025-02-24 | Stop reason: HOSPADM

## 2025-02-24 RX ORDER — SODIUM CHLORIDE 0.9 % (FLUSH) 0.9 %
10 SYRINGE (ML) INJECTION EVERY 12 HOURS SCHEDULED
Status: DISCONTINUED | OUTPATIENT
Start: 2025-02-24 | End: 2025-02-24 | Stop reason: HOSPADM

## 2025-02-24 RX ORDER — SODIUM CHLORIDE 9 MG/ML
40 INJECTION, SOLUTION INTRAVENOUS AS NEEDED
Status: DISCONTINUED | OUTPATIENT
Start: 2025-02-24 | End: 2025-02-24 | Stop reason: HOSPADM

## 2025-02-24 RX ADMIN — PROPOFOL 200 MG: 10 INJECTION, EMULSION INTRAVENOUS at 08:13

## 2025-02-24 RX ADMIN — NEOSTIGMINE 2 MG: 1 INJECTION INTRAVENOUS at 08:39

## 2025-02-24 RX ADMIN — NITROGLYCERIN 0.8 MG: 0.4 TABLET SUBLINGUAL at 00:30

## 2025-02-24 RX ADMIN — SODIUM CHLORIDE, POTASSIUM CHLORIDE, SODIUM LACTATE AND CALCIUM CHLORIDE: 600; 310; 30; 20 INJECTION, SOLUTION INTRAVENOUS at 08:09

## 2025-02-24 RX ADMIN — ROCURONIUM BROMIDE 10 MG: 10 INJECTION INTRAVENOUS at 08:13

## 2025-02-24 RX ADMIN — SODIUM CHLORIDE 1000 ML: 9 INJECTION, SOLUTION INTRAVENOUS at 00:05

## 2025-02-24 RX ADMIN — ONDANSETRON 4 MG: 2 INJECTION INTRAMUSCULAR; INTRAVENOUS at 08:19

## 2025-02-24 RX ADMIN — GLUCAGON 1 MG: KIT at 00:06

## 2025-02-24 RX ADMIN — MIDAZOLAM HYDROCHLORIDE 1 MG: 1 INJECTION, SOLUTION INTRAMUSCULAR; INTRAVENOUS at 03:53

## 2025-02-24 RX ADMIN — GLYCOPYRROLATE 0.3 MCG: 0.2 INJECTION, SOLUTION INTRAMUSCULAR; INTRAVENOUS at 08:39

## 2025-02-24 RX ADMIN — PROCHLORPERAZINE EDISYLATE 10 MG: 5 INJECTION INTRAMUSCULAR; INTRAVENOUS at 02:25

## 2025-02-24 RX ADMIN — DEXAMETHASONE SODIUM PHOSPHATE 4 MG: 4 INJECTION INTRA-ARTICULAR; INTRALESIONAL; INTRAMUSCULAR; INTRAVENOUS; SOFT TISSUE at 08:17

## 2025-02-24 RX ADMIN — DIAZEPAM 5 MG: 10 INJECTION, SOLUTION INTRAMUSCULAR; INTRAVENOUS at 00:06

## 2025-02-24 RX ADMIN — Medication 140 MG: at 08:13

## 2025-02-24 RX ADMIN — LIDOCAINE HYDROCHLORIDE 50 MG: 10 INJECTION, SOLUTION EPIDURAL; INFILTRATION; INTRACAUDAL; PERINEURAL at 08:13

## 2025-02-24 RX ADMIN — Medication 10 ML: at 01:59

## 2025-02-24 RX ADMIN — ROCURONIUM BROMIDE 10 MG: 10 INJECTION INTRAVENOUS at 08:17

## 2025-02-24 RX ADMIN — PROPOFOL 100 MG: 10 INJECTION, EMULSION INTRAVENOUS at 08:15

## 2025-02-24 RX ADMIN — SODIUM CHLORIDE 100 ML/HR: 9 INJECTION, SOLUTION INTRAVENOUS at 01:58

## 2025-02-24 NOTE — ANESTHESIA POSTPROCEDURE EVALUATION
Patient: Chetan Gandhi    Procedure Summary       Date: 02/24/25 Room / Location:  [facility]    Anesthesia Start: 0809 Anesthesia Stop: 0849    Procedure: ESOPHAGOGASTRODUODENOSCOPY WITH FOREIGN BODY REMOVAL Diagnosis: (Food Impaction)    Surgeons: Santhosh Hernandez MD Provider: Dionisio Merrill MD    Anesthesia Type: general ASA Status: 2            Anesthesia Type: general    Vitals  Vitals Value Taken Time   /60 02/24/25 0845   Temp 97 °F (36.1 °C) 02/24/25 0843   Pulse 73 02/24/25 0852   Resp 20 02/24/25 0845   SpO2 94 % 02/24/25 0852   Vitals shown include unfiled device data.        Post Anesthesia Care and Evaluation    Patient location during evaluation: PACU  Patient participation: complete - patient participated  Level of consciousness: awake and alert  Pain management: adequate    Airway patency: patent  Anesthetic complications: No anesthetic complications  PONV Status: none  Cardiovascular status: hemodynamically stable and acceptable  Respiratory status: nonlabored ventilation, acceptable and nasal cannula  Hydration status: acceptable

## 2025-02-24 NOTE — CONSULTS
[facility]:  Inpatient Gastroenterology Consult      Inpatient Gastroenterology Consult  Consult performed by: Last White APRN  Consult ordered by: Diognees Espinoza MD  Reason for consult: Esophageal food impaction      Referring Provider: No ref. provider found    PCP: Juwan Bryson DO    Chief Complaint: Difficulty swallowing    History of present illness:    Chetan Gandhi is a 34 y.o. male who is admitted with an esophageal food impaction.  Patient notes that he was at his usual state health until last night when he was eating a piece of chicken and it got stuck in his esophagus.  Notes that he was unable to clear the obstruction; someone at his home actually performed the Heimlich maneuver to no effect.  Upon further review, patient notes he has been having difficulty swallowing certain foods for some time now though he is usually able to clear the obstruction with liquids.  Does not report any odynophagia.  No reported N/V/D, abdominal pain, SOB, CP, or F/C.  No prior endoscopy reported.  Reports some GERD and dyspepsia.  Has not been on PPI. Past medical, surgical, social, and family histories are reviewed for accuracy.  No documented alleviating or exacerbating factors.  Does not endorse pain at time of exam.    Allergies:  Patient has no known allergies.    Scheduled Meds:  sodium chloride, 10 mL, Intravenous, Q12H         Infusions:  sodium chloride, 100 mL/hr, Last Rate: 100 mL/hr (02/24/25 0522)        PRN Meds:    Calcium Replacement - Follow Nurse / BPA Driven Protocol    Magnesium Standard Dose Replacement - Follow Nurse / BPA Driven Protocol    midazolam    Morphine **AND** naloxone    nitroglycerin    nitroglycerin    ondansetron    Phosphorus Replacement - Follow Nurse / BPA Driven Protocol    Potassium Replacement - Follow Nurse / BPA Driven Protocol    [COMPLETED] Insert Peripheral IV **AND** sodium chloride    sodium chloride    sodium  "chloride    Home Meds:  (Not in a hospital admission)      ROS: Review of Systems   Constitutional: Negative.  Negative for activity change, appetite change, chills, diaphoresis, fatigue, fever and unexpected weight change.   HENT:  Positive for trouble swallowing. Negative for sore throat and voice change.    Eyes: Negative.    Respiratory: Negative.  Negative for apnea, cough, choking, chest tightness, shortness of breath, wheezing and stridor.    Cardiovascular: Negative.  Negative for chest pain, palpitations and leg swelling.   Gastrointestinal:  Negative for abdominal distention, abdominal pain, anal bleeding, blood in stool, constipation, diarrhea, nausea, rectal pain and vomiting.   Endocrine: Negative.    Genitourinary: Negative.    Musculoskeletal: Negative.    Skin: Negative.  Negative for color change and pallor.   Allergic/Immunologic: Negative.    Neurological: Negative.    Hematological:  Negative for adenopathy. Does not bruise/bleed easily.   Psychiatric/Behavioral: Negative.     All other systems reviewed and are negative.      PAST MED HX:  Past Medical History:   Diagnosis Date    Acute appendicitis 11/27/2019    COVID-19 positive 12/3/21 12/14/2021       PAST SURG HX:  Past Surgical History:   Procedure Laterality Date    APPENDECTOMY N/A 8/11/2020    Procedure: APPENDECTOMY LAPAROSCOPIC;  Surgeon: Renata Conway MD;  Location: [facility];  Service: General;  Laterality: N/A;       FAM HX:  Family History   Problem Relation Age of Onset    Hemochromatosis Maternal Grandfather        SOC HX:  Social History     Socioeconomic History    Marital status:    Tobacco Use    Smoking status: Never    Smokeless tobacco: Never   Vaping Use    Vaping status: Never Used   Substance and Sexual Activity    Alcohol use: No    Drug use: No    Sexual activity: Defer       PHYSICAL EXAM  /70   Pulse 88   Temp 98.1 °F (36.7 °C) (Oral)   Resp 18   Ht 180.3 cm (71\")   Wt 106 kg (233 lb 11 oz) "   SpO2 98%   BMI 32.59 kg/m²   Wt Readings from Last 3 Encounters:   02/23/25 106 kg (233 lb 11 oz)   12/13/24 106 kg (233 lb 12.8 oz)   12/07/24 102 kg (225 lb)   ,body mass index is 32.59 kg/m².  Physical Exam  Vitals and nursing note reviewed.   Constitutional:       General: He is not in acute distress.     Appearance: Normal appearance. He is normal weight. He is not ill-appearing or toxic-appearing.   HENT:      Head: Normocephalic and atraumatic.   Eyes:      General: No scleral icterus.     Extraocular Movements: Extraocular movements intact.      Conjunctiva/sclera: Conjunctivae normal.      Pupils: Pupils are equal, round, and reactive to light.   Cardiovascular:      Rate and Rhythm: Normal rate and regular rhythm.      Pulses: Normal pulses.      Heart sounds: Normal heart sounds.   Pulmonary:      Effort: Pulmonary effort is normal. No respiratory distress.      Breath sounds: Normal breath sounds.   Abdominal:      General: Abdomen is flat. Bowel sounds are normal. There is no distension.      Palpations: Abdomen is soft. There is no mass.      Tenderness: There is no abdominal tenderness. There is no guarding or rebound.      Hernia: No hernia is present.   Genitourinary:     Rectum: Guaiac result negative.   Musculoskeletal:      Right lower leg: No edema.      Left lower leg: No edema.   Skin:     General: Skin is warm and dry.      Capillary Refill: Capillary refill takes less than 2 seconds.      Coloration: Skin is not jaundiced or pale.   Neurological:      General: No focal deficit present.      Mental Status: He is alert and oriented to person, place, and time.   Psychiatric:         Mood and Affect: Mood normal.         Behavior: Behavior normal.         Thought Content: Thought content normal.         Judgment: Judgment normal.       Results Review:  I reviewed the patient's new clinical results.  I reviewed the patient's new imaging results and agree with the interpretation.  I reviewed  the patient's other test results and agree with the interpretation  I personally viewed and interpreted the patient's EKG/Telemetry data    Lab Results   Component Value Date    WBC 9.40 02/24/2025    HGB 14.1 02/24/2025    HGB 14.4 02/23/2025    HGB 12.4 (L) 08/13/2020    HCT 42.8 02/24/2025    MCV 80.0 02/24/2025     02/24/2025       Lab Results   Component Value Date    INR 1.00 02/24/2025       Lab Results   Component Value Date    GLUCOSE 110 (H) 02/24/2025    BUN 17 02/24/2025    CREATININE 1.13 02/24/2025    EGFRIFNONA 68 10/01/2020    BCR 15.0 02/24/2025     02/24/2025    K 3.9 02/24/2025    CO2 23.0 02/24/2025    CALCIUM 9.1 02/24/2025    ALBUMIN 4.4 02/24/2025    ALKPHOS 82 02/24/2025    BILITOT 0.4 02/24/2025    ALT 32 02/24/2025    AST 24 02/24/2025     XR Chest 1 View  Result Date: 2/23/2025  XR CHEST 1 VW Date of Exam: 2/23/2025 11:15 PM EST Indication: cough Comparison: 12/7/2024. Findings: There is no pneumothorax, pleural effusion or focal airspace consolidation. Heart size and pulmonary vasculature appear within normal limits. Regional bones appear intact.     Impression: No acute cardiopulmonary abnormality. Electronically Signed: Orville King MD  2/23/2025 11:59 PM EST  Workstation ID: [id number]     ASSESSMENTS/PLANS  1.  Esophageal food bolus  2.  GERD    Chetan Gandhi is a 34 y.o. male who presents to hospital with esophageal food bolus.  Recommend EGD today for further evaluation.  Given patient's age, will evaluate for eosinophilic esophagitis-will likely need follow-up in outpatient GI clinic at discharge.    >>> N.p.o.  >>> EGD today  >>> IV PPI twice daily while inpatient  >>> Patient to follow-up with outpatient GI at discharge.    Anticipate discharge following EGD.    I discussed the patient's findings and my recommendations with patient, family, nursing staff, and consulting provider    Last White, KASEY  02/24/25  07:29 EST

## 2025-02-24 NOTE — H&P
"    [facility] Medicine Services  HISTORY AND PHYSICAL    Patient Name: Chetan Gandhi  : 1990  MRN: [id number]  Primary Care Physician: Juwan Bryson DO  Date of admission: 2025      Subjective   Subjective     Chief Complaint:  Food impaction    HPI:  Chetan Gandhi is a 34 y.o. male who states that he was eating chicken at a local restaurant earlier tonight (Vitaliy 10/23) at around 7:30 PM and felt like a piece of food became stuck in his throat.  He attempted to drink fluids after onset of the sensation and states that \"I got choked even more.\"  His significant other actually attempted a Heimlich procedure; he states the fluid came out but the sensation of something stuck in his throat persisted.  Any further attempts on drinking water have been unsuccessful, with the patient stating that \"the water comes right out.\"  He also confirms some nausea late in the evening, and he does mention that occasionally the area of impaction can be slightly painful.  He denies any shortness of breath or wheezing, no stridor.  Also denies chest pain, fever/chills, dizziness/lightheadedness, slurred speech/facial droop, focal weakness, abdominal pain, bowel or change, or syncope.  He states he has no medical history and takes no home medications, but he does confirm that he has had >10 episodes of fluid boluses/impactions in the past, but he states he has always been able to facilitate passage, he has never had a persistent sensation of food stuck in his throat and has never required any admission to the hospital or any procedure.      Personal History     Past Medical History:   Diagnosis Date    Acute appendicitis 2019    COVID-19 positive 12/3/21 2021           Past Surgical History:   Procedure Laterality Date    APPENDECTOMY N/A 2020    Procedure: APPENDECTOMY LAPAROSCOPIC;  Surgeon: Renata Conway MD;  Location: [facility];  Service: General;  " Laterality: N/A;       Family History: family history includes Hemochromatosis in his maternal grandfather.     Social History:  reports that he has never smoked. He has never used smokeless tobacco. He reports that he does not drink alcohol and does not use drugs.  He does not drink alcohol, does not smoke cigarettes, no street drug/illicit substances.      Medications:  Available home medication information reviewed.       No Known Allergies    Objective   Objective     Vital Signs:   Temp:  [98.1 °F (36.7 °C)] 98.1 °F (36.7 °C)  Heart Rate:  [76-98] 98  Resp:  [18] 18  BP: (139-148)/() 148/91       Physical Exam   Constitutional: Awake, alert, NAD.  Eyes: PERRLA, sclerae anicteric, no conjunctival injection  HENT: NCAT, mucous membranes moist  Neck: Supple, no thyromegaly, no lymphadenopathy, trachea midline  Respiratory: Clear to auscultation bilaterally, nonlabored respirations   Cardiovascular: RRR, no murmurs, rubs, or gallops, palpable pedal pulses bilaterally  Gastrointestinal: Positive bowel sounds, soft, nontender, nondistended  Musculoskeletal: No bilateral ankle edema, no clubbing or cyanosis to extremities  Psychiatric: Appropriate affect, cooperative  Neurologic: Oriented x 3, strength symmetric in all extremities, Cranial Nerves grossly intact to confrontation, speech clear  Skin: No rashes, normal turgor.    Result Review:  I have personally reviewed the results from the time of this admission to 2/24/2025 01:17 EST and agree with these findings:  [x]  Laboratory list / accordion  []  Microbiology  [x]  Radiology  []  EKG/Telemetry   []  Cardiology/Vascular   []  Pathology  [x]  Old records  []  Other:  Most notable findings include: I reviewed chest x-ray which is a single AP view and by my read shows nothing acute.      LAB RESULTS:      Lab 02/23/25  2350   WBC 9.00   HEMOGLOBIN 14.4   HEMATOCRIT 43.7   PLATELETS 266   NEUTROS ABS 6.60   IMMATURE GRANS (ABS) 0.06*   LYMPHS ABS 1.56   MONOS  ABS 0.51   EOS ABS 0.20   MCV 81.7         Lab 02/23/25  2350   SODIUM 139   POTASSIUM 4.3   CHLORIDE 104   CO2 24.0   ANION GAP 11.0   BUN 18   CREATININE 1.29*   EGFR 75.1   GLUCOSE 101*   CALCIUM 9.6         Lab 02/23/25  2350   TOTAL PROTEIN 7.3   ALBUMIN 4.5   GLOBULIN 2.8   ALT (SGPT) 38   AST (SGOT) 26   BILIRUBIN 0.3   ALK PHOS 81   LIPASE 34                         Microbiology Results (last 10 days)       ** No results found for the last 240 hours. **            XR Chest 1 View    Result Date: 2/23/2025  XR CHEST 1 VW Date of Exam: 2/23/2025 11:15 PM EST Indication: cough Comparison: 12/7/2024. Findings: There is no pneumothorax, pleural effusion or focal airspace consolidation. Heart size and pulmonary vasculature appear within normal limits. Regional bones appear intact.     Impression: Impression: No acute cardiopulmonary abnormality. Electronically Signed: Orville King MD  2/23/2025 11:59 PM EST  Workstation ID: [id number]         Assessment & Plan   Assessment & Plan       Food impaction of esophagus      34M with food impaction    Food impaction  - NPO.  - Light IVF hydration with 0.9 NS.  - Nausea control with IV Zofran as needed.  - GI consult, called from the ED, will follow up recommendations; ED provider states upper endoscopy on Monday.    Increased serum creatinine  - He denies any history of any renal diagnoses.  - He has had some increased creatinine levels on prior visits, other normal values in the past as well.  - Light IVF hydration with 0.9 NS.  - A.m. metabolic panel.          Total time spent: 78 minutes  Time spent includes time reviewing chart, face-to-face time, counseling patient/family/caregiver, ordering medications/tests/procedures, communicating with other health care professionals, documenting clinical information in the electronic health record, and coordination of care.     VTE Prophylaxis:  Mechanical VTE prophylaxis orders are present.          CODE STATUS: Full  Code  Status and Medical Interventions: CPR (Attempt to Resuscitate); Full Support   Ordered at: 02/24/25 0116     Level Of Support Discussed With:    Patient     Code Status (Patient has no pulse and is not breathing):    CPR (Attempt to Resuscitate)     Medical Interventions (Patient has pulse or is breathing):    Full Support       Expected Discharge   TBD, likely 2/24    Jonathan Guthrie,III, DO  02/24/25

## 2025-02-24 NOTE — DISCHARGE SUMMARY
[facility] Medicine Services  DISCHARGE SUMMARY    Patient Name: Chetan Gandhi  : 1990  MRN: [id number]    Date of Admission: 2025 10:54 PM  Date of Discharge:  2025  Primary Care Physician: Juwan Bryson DO    Consults       Date and Time Order Name Status Description    2025  1:05 AM Inpatient Gastroenterology Consult Completed             Hospital Course     Presenting Problem: food impaction    Active Hospital Problems   No active problems to display.      Resolved Hospital Problems    Diagnosis Date Resolved POA    **Food impaction of esophagus [T18.128A, W44.F3XA] 2025 Yes          Hospital Course:  Chetan Gandhi is a 34 y.o. male who was eating chicken earlier this evening prior to admission when he suddenly felt the sensation of food becoming stuck in his throat.  Despite fluid intake afterwards the sensation persisted.  He was admitted with suspicion for food impaction, GI evaluated and performed EGD on .  Food was seen in the lower third of the esophagus and removal was successful.  He was found to have esophageal mucosal changes suggestive of eosinophilic esophagitis, biopsies were obtained.  Stomach examined was normal, duodenum exam and was also normal.  He has been scheduled for follow-up with GI in 4 weeks.  He may need to have a repeat EGD in about 8 weeks.  He will be discharged with twice daily PPI.  We discussed diet recommendations, liquid diet today, soft diet tomorrow and then advance as tolerated.  GI is okay with discharge today.    Of note he was noted to have elevated creatinine on his admission BMP, this improved with IV fluids.  He has upcoming appointment with his primary care to continue to monitor.  Encouraged hydration, suspect some component of prerenal etiology      Discharge Follow Up Recommendations for outpatient labs/diagnostics:   GI, PCP    Day of Discharge     HPI:   Feels much better after  EGD    Review of Systems  Gen- No fevers, chills  CV- No chest pain, palpitations  Resp- No cough, dyspnea  GI- No N/V/D, abd pain      Vital Signs:   Temp:  [97 °F (36.1 °C)-98.1 °F (36.7 °C)] 97 °F (36.1 °C)  Heart Rate:  [] 77  Resp:  [18-20] 20  BP: (104-148)/() 117/79      Physical Exam:  Constitutional: No acute distress, awake, alert  HENT: NCAT, mucous membranes moist  Respiratory: Clear to auscultation bilaterally, respiratory effort normal   Cardiovascular: RRR, no murmurs, rubs, or gallops  Gastrointestinal: Positive bowel sounds, soft, nontender, nondistended  Musculoskeletal: No bilateral ankle edema  Psychiatric: Appropriate affect, cooperative  Neurologic: Oriented x 3, strength symmetric in all extremities, Cranial Nerves grossly intact to confrontation, speech clear  Skin: No rashes      Pertinent  and/or Most Recent Results     LAB RESULTS:      Lab 02/24/25  0602 02/23/25  2350   WBC 9.40 9.00   HEMOGLOBIN 14.1 14.4   HEMATOCRIT 42.8 43.7   PLATELETS 268 266   NEUTROS ABS 7.90* 6.60   IMMATURE GRANS (ABS) 0.04 0.06*   LYMPHS ABS 0.95 1.56   MONOS ABS 0.42 0.51   EOS ABS 0.04 0.20   MCV 80.0 81.7   PROTIME 13.3  --          Lab 02/24/25  0602 02/23/25  2350   SODIUM 138 139   POTASSIUM 3.9 4.3   CHLORIDE 103 104   CO2 23.0 24.0   ANION GAP 12.0 11.0   BUN 17 18   CREATININE 1.13 1.29*   EGFR 88.0 75.1   GLUCOSE 110* 101*   CALCIUM 9.1 9.6   MAGNESIUM 2.2  --          Lab 02/24/25  0602 02/23/25  2350   TOTAL PROTEIN 7.2 7.3   ALBUMIN 4.4 4.5   GLOBULIN 2.8 2.8   ALT (SGPT) 32 38   AST (SGOT) 24 26   BILIRUBIN 0.4 0.3   ALK PHOS 82 81   LIPASE  --  34         Lab 02/24/25  0602   PROTIME 13.3   INR 1.00                 Brief Urine Lab Results       None          Microbiology Results (last 10 days)       ** No results found for the last 240 hours. **            XR Chest 1 View    Result Date: 2/23/2025  XR CHEST 1 VW Date of Exam: 2/23/2025 11:15 PM EST Indication: cough Comparison:  12/7/2024. Findings: There is no pneumothorax, pleural effusion or focal airspace consolidation. Heart size and pulmonary vasculature appear within normal limits. Regional bones appear intact.     Impression: No acute cardiopulmonary abnormality. Electronically Signed: Orville King MD  2/23/2025 11:59 PM EST  Workstation ID: [id number]                 Plan for Follow-up of Pending Labs/Results: f/u w GI  Pending Labs       Order Current Status    Tissue Pathology Exam Collected (02/24/25 0830)          Discharge Details        Discharge Medications        New Medications        Instructions Start Date   pantoprazole 40 MG EC tablet  Commonly known as: PROTONIX   40 mg, Oral, 2 Times Daily Before Meals               No Known Allergies      Discharge Disposition:  Home or Self Carehome    Diet:  Hospital:  Diet Order   Procedures    Diet: Liquid; Full Liquid; Fluid Consistency: Thin (IDDSI 0)            Activity:  as tolerated    Restrictions or Other Recommendations:  none       CODE STATUS:    Code Status and Medical Interventions: CPR (Attempt to Resuscitate); Full Support   Ordered at: 02/24/25 0116     Level Of Support Discussed With:    Patient     Code Status (Patient has no pulse and is not breathing):    CPR (Attempt to Resuscitate)     Medical Interventions (Patient has pulse or is breathing):    Full Support       No future appointments.              Lary Junior MD  02/24/25      Time Spent on Discharge:  I spent  45  minutes on this discharge activity which included: face-to-face encounter with the patient, reviewing the data in the system, coordination of the care with the nursing staff as well as consultants, documentation, and entering orders.

## 2025-02-24 NOTE — ANESTHESIA PROCEDURE NOTES
Airway  Urgency: elective    Date/Time: 2/24/2025 8:18 AM  Airway not difficult    General Information and Staff    Patient location during procedure: OR  CRNA/CAA: Junior BIMAL Pablo, CRNA    Indications and Patient Condition  Indications for airway management: airway protection    Preoxygenated: yes  MILS not maintained throughout  Mask difficulty assessment: 1 - vent by mask    Final Airway Details  Final airway type: endotracheal airway      Successful airway: ETT  Cuffed: yes   Successful intubation technique: direct laryngoscopy and RSI  Facilitating devices/methods: cricoid pressure  Endotracheal tube insertion site: oral  Blade: Brody  Blade size: 3  ETT size (mm): 7.0  Cormack-Lehane Classification: grade I - full view of glottis  Placement verified by: chest auscultation and capnometry   Measured from: lips  ETT/EBT  to lips (cm): 20  Number of attempts at approach: 1  Assessment: lips, teeth, and gum same as pre-op and atraumatic intubation    Additional Comments  Negative epigastric sounds, Breath sound equal bilaterally with symmetric chest rise and fall

## 2025-02-24 NOTE — ED PROVIDER NOTES
Subjective   History of Present Illness  This is a 34-year-old male with past medical history of appendectomy presenting to the emergency department with a possible food bolus.  The patient states that he was out to eat when he was eating some chicken.  He felt like it got lodged up in his throat.  He tried to drink something afterwards and then choked even worse.  His significant other did the Heimlich maneuver and they got the fluid up, however he still feels like there is some shaking in his throat.  Patient tried drinking water afterwards and it came right out.  He does have a history of swallowing issues and esophageal spasm.  He denies any fevers or chills.  Headache or change in vision.  Focal weakness.  No chest pain.  Some mild nausea    History provided by:  Patient   used: No        Review of Systems   Constitutional:  Negative for chills and fever.   HENT:  Negative for congestion, ear pain and sore throat.    Eyes:  Negative for visual disturbance.   Respiratory:  Negative for shortness of breath.    Cardiovascular:  Negative for chest pain.   Gastrointestinal:  Positive for nausea and vomiting. Negative for abdominal pain.   Genitourinary:  Negative for difficulty urinating.   Musculoskeletal:  Negative for arthralgias.   Skin:  Negative for rash.   Neurological:  Negative for dizziness, weakness and numbness.   Psychiatric/Behavioral:  Negative for agitation.        Past Medical History:   Diagnosis Date    Acute appendicitis 11/27/2019    COVID-19 positive 12/3/21 12/14/2021       No Known Allergies    Past Surgical History:   Procedure Laterality Date    APPENDECTOMY N/A 8/11/2020    Procedure: APPENDECTOMY LAPAROSCOPIC;  Surgeon: Renata Conway MD;  Location: [facility];  Service: General;  Laterality: N/A;       Family History   Problem Relation Age of Onset    Hemochromatosis Maternal Grandfather        Social History     Socioeconomic History    Marital status:     Tobacco Use    Smoking status: Never    Smokeless tobacco: Never   Vaping Use    Vaping status: Never Used   Substance and Sexual Activity    Alcohol use: No    Drug use: No    Sexual activity: Defer           Objective   Physical Exam  Vitals and nursing note reviewed.   Constitutional:       General: He is not in acute distress.     Appearance: He is not ill-appearing or toxic-appearing.   HENT:      Mouth/Throat:      Pharynx: No posterior oropharyngeal erythema.   Eyes:      Extraocular Movements: Extraocular movements intact.      Pupils: Pupils are equal, round, and reactive to light.   Cardiovascular:      Rate and Rhythm: Normal rate and regular rhythm.   Pulmonary:      Effort: Pulmonary effort is normal. No respiratory distress.   Abdominal:      General: Abdomen is flat. There is no distension.      Palpations: There is no mass.      Tenderness: There is no abdominal tenderness. There is no guarding or rebound.   Musculoskeletal:         General: No deformity. Normal range of motion.   Neurological:      General: No focal deficit present.      Mental Status: He is alert.      Sensory: No sensory deficit.      Motor: No weakness.         Procedures           ED Course  ED Course as of 02/24/25 0125   Mon Feb 24, 2025   0027 BP(!): 145/101 [JK]   0027 Temp: 98.1 °F (36.7 °C) [JK]   0027 Temp src: Oral [JK]   0027 Heart Rate: 76 [JK]   0027 Resp: 18 [JK]   0027 SpO2: 99 % [JK]   0027 Device (Oxygen Therapy): room air  Interpretation:  Patient's repeat vitals, telemetry tracing, and pulse oximetry tracing were directly viewed and interpreted by myself.   O2 sat 99% on room air, interpreted as normal.  Telemetry rhythm strip revealed a rate of 76 bpm, interpreted as normal sinus [JK]   0124 Comprehensive Metabolic Panel(!) [JK]   0124 Lipase [JK]   0124 CBC & Differential(!)  Interpretation:  Laboratory studies were reviewed and interpreted directly by myself.  CMP shows mild elevation creatinine 1.29,  lipase normal, CBC normal [JK]   0124 XR Chest 1 View  Interpretation:  Imaging was directly visualized by myself, per my interpretations, chest x-ray unremarkable. [JK]   0124 After multiple rounds of medication, the patient continues to have obstruction.  Unable to tolerate oral intake.  I did speak with gastroenterology.  Patient be scheduled for endoscopy in the morning [JK]   0125 Based on the patient's presentation, history and diffuse work-up in the emergency department, the patient is deemed appropriate for admission to the hospital for further evaluation and treatment.  This was discussed with the patient at bedside.  They are in agreement with the current medical management.    Admitting physician: Dr. Guthrie    Discussion was had with admitting physician regarding the laboratory and imaging findings.  We did discuss current therapeutics in the emergency department and progression of the patient.  Working diagnosis was conveyed to the admitting physician, as well as current status and prognosis for the patient.  They are in agreement with these findings and have accepted admission.    Shared decision making:   After full review of the patient's clinical presentation, review of any work-up including but not limited to laboratory studies and radiology obtained, I had a discussion with the patient.  Treatment options were discussed as well as the risks, benefits and consequences.  I discussed all findings with the patient and family members if available.  During the discussion, treatment goals were understood by all as well as any misconceptions which were addressed with the patient.  Ample time was given for any questions they may have had.  They are in agreement with the treatment plan as well as final disposition. [JK]      ED Course User Index  [JK] Diogenes Espinoza MD                                                       Medical Decision Making  This is a 34-year-old male presented emergency  department with vomiting.  Patient symptoms are consistent with impacted food bolus.  We will provide symptomatic treatment to try and relieve the obstruction.  No evidence of airway obstruction at this time.  Overall, the patient is nontoxic.  Afebrile.  IV access was established in the patient.  Placed on continuous telemetry monitoring.  Given the patient's presentation, differential is broad and will require further evaluation.  Workup initiated.      Differential diagnosis: Impacted food bolus, esophageal abrasion, dyspepsia, nausea, vomiting, acute kidney injury, electrolyte abnormality      Amount and/or Complexity of Data Reviewed  External Data Reviewed: labs, radiology, ECG and notes.     Details: External laboratories, imaging as well as notes were reviewed personally by myself.  All relevant studies were used to guide decision making.     Date of previous record: 8/11/2020    Source of note: Admission Record    Summary:  Patient was seen and admitted for acute appendicitis.  I did review basic laboratory studies on file as well as a previous chest x-ray and EKG.  Records reviewed      Labs: ordered. Decision-making details documented in ED Course.  Radiology: ordered and independent interpretation performed. Decision-making details documented in ED Course.    Risk  Prescription drug management.  Decision regarding hospitalization.        Final diagnoses:   Food impaction of esophagus, initial encounter   Acute kidney injury       ED Disposition  ED Disposition       ED Disposition   Decision to Admit    Condition   --    Comment   Level of Care: Telemetry [5]   Diagnosis: Food impaction of esophagus [[id number]]   Admitting Physician: LORETTA NEWMAN III [[id number]]   Attending Physician: LORETTA NEWMAN III [[id number]]   Is patient appropriate for Inpatient Observation Unit?: No [0]   Bed Request Comments: tele obs                 No follow-up provider specified.       Medication List      No  changes were made to your prescriptions during this visit.            Diogenes Espinoza MD  02/24/25 0128

## 2025-02-24 NOTE — ANESTHESIA PREPROCEDURE EVALUATION
Anesthesia Evaluation     Patient summary reviewed and Nursing notes reviewed   NPO Solid Status: > 8 hours  NPO Liquid Status: > 2 hours           Airway   Mallampati: I  TM distance: >3 FB  Neck ROM: full  No difficulty expected  Dental      Pulmonary    (-) shortness of breath, recent URI, sleep apnea, not a smoker, no home oxygen  Cardiovascular     (-) pacemaker, hypertension, past MI, dysrhythmias, angina, cardiac stents      Neuro/Psych  (-) seizures, CVA  GI/Hepatic/Renal/Endo    (+) renal disease- CRI and ARF  (-) morbid obesity, diabetes, no thyroid disorder    Musculoskeletal     Abdominal    Substance History      OB/GYN          Other        ROS/Med Hx Other: Admitted c food impaction and increased creatnine   Creatnine now resolved K normal through out     NO Drooling or saliva pooling                     Anesthesia Plan    ASA 2     general   Rapid sequence  (RSI CP ETT )  intravenous induction     Anesthetic plan, risks, benefits, and alternatives have been provided, discussed and informed consent has been obtained with: patient.    Plan discussed with CRNA.        CODE STATUS:    Level Of Support Discussed With: Patient  Code Status (Patient has no pulse and is not breathing): CPR (Attempt to Resuscitate)  Medical Interventions (Patient has pulse or is breathing): Full Support

## 2025-02-24 NOTE — OUTREACH NOTE
Prep Survey      Flowsheet Row Responses   [facility] patient discharged from? [place]   Is LACE score < 7 ? Yes   Eligibility [facility]   Date of Admission 02/23/25   Date of Discharge 02/24/25   Discharge Disposition Home or Self Care   Discharge diagnosis Food impaction of esophagus, EGD  WITH FOREIGN BODY REMOVAL   Does the patient have one of the following disease processes/diagnoses(primary or secondary)? Other   Does the patient have Home health ordered? No   Is there a DME ordered? No   Prep survey completed? Yes            Mary LERNER - Registered Nurse

## 2025-02-25 ENCOUNTER — TRANSITIONAL CARE MANAGEMENT TELEPHONE ENCOUNTER (OUTPATIENT)
Dept: CALL CENTER | Facility: HOSPITAL | Age: 35
End: 2025-02-25
Payer: COMMERCIAL

## 2025-02-25 LAB
CYTO UR: NORMAL
LAB AP CASE REPORT: NORMAL
LAB AP CLINICAL INFORMATION: NORMAL
PATH REPORT.FINAL DX SPEC: NORMAL
PATH REPORT.GROSS SPEC: NORMAL

## 2025-02-25 NOTE — PROGRESS NOTES
Pathology from recent EGD with biopsy as follows:    Biopsies from the upper and lower esophagus indicate increased eosinophils consistent with eosinophilic esophagitis, a benign inflammatory condition that can cause issues with swallowing as we discussed during your hospital stay.   Continue twice daily Protonix 40 mg.   Follow up in GI office as scheduled to reassess symptoms and for consideration of repeat EGD in about 8 weeks to monitor response on PPI.  Additional medications may be considered at that time.

## 2025-02-25 NOTE — OUTREACH NOTE
Call Center TCM Note      Flowsheet Row Responses   [facility], [place] patient discharged from? [facility]   Does the patient have one of the following disease processes/diagnoses(primary or secondary)? Other   TCM attempt successful? Yes  [VR graciela -Cyndi]   Call start time 1111   Call end time 1122   Discharge diagnosis Food impaction of esophagus, EGD  WITH FOREIGN BODY REMOVAL   Meds reviewed with patient/caregiver? Yes   Is the patient having any side effects they believe may be caused by any medication additions or changes? No   Does the patient have all medications ordered at discharge? No   What is keeping the patient from filling the prescriptions? --  [Pharmacy does not have the prescription ready per pt report.]   Nursing Interventions Nurse provided patient education   Prescription comments The patient states that his wife plans to attempt to  the medication at [facility] again today.   Is the patient taking all medications as directed (includes completed medication regime)? No   What is preventing the patient from taking all medications as directed? Other   Nursing Interventions Nurse provided patient education   Comments Pt is eligible for TCM f/u appt   Does the patient have an appointment with their PCP within 7-14 days of discharge? No   Nursing Interventions Patient desires to follow up with specialty only   Comments Patient reports some mild discomfort in his throat from procedure but denies difficulty swallowing or airflow. Patient reports that he is following a liquid diet. RN and pt discussed opportunities to add protein to liquid diets to maintain adequate protein intake daily. Pt denies issues with po intake, denies frequent cough or clearing throat.   Did the patient receive a copy of their discharge instructions? Yes   Nursing interventions Reviewed instructions with patient   What is the patient's perception of their health status since discharge? Improving   Is the patient/caregiver able to  teach back signs and symptoms related to disease process for when to call PCP? Yes   Is the patient/caregiver able to teach back signs and symptoms related to disease process for when to call 911? Yes   Is the patient/caregiver able to teach back the hierarchy of who to call/visit for symptoms/problems? PCP, Specialist, Home health nurse, Urgent Care, ED, 911 Yes   TCM call completed? Yes   Call end time 1122            Farida Whitten RN    2/25/2025, 11:23 EST

## 2025-04-30 NOTE — PROGRESS NOTES
Follow Up      Patient Name: Chetan Gandhi  : 1990   MRN: [id number]     Chief Complaint: Hospital follow-up, eosinophilic esophagitis    You have chosen to receive care through a telehealth visit.  Do you consent to use a video/audio connection for your medical care today? Yes    History of Present Illness: Chetan Gandhi is a 35 y.o. male who is here today for follow up on eosinophilic esophagitis.    John notes he has been feeling okay since his recent presentation to the hospital.  Swallowing has been much improved on PPI therapy.  Has had no further impaction like symptoms.  Does not report any dysphagia or odynophagia like symptoms at present.  Notes his GERD is well-controlled with his current dose of PPI.  No breakthrough symptoms reported.  Past medical, surgical, social, and family histories are reviewed for accuracy.  No documented alleviating or exacerbating factors.  Does not endorse pain at time of exam.    Subjective      Review of Systems:   Review of Systems   Constitutional: Negative.    HENT: Negative.     Eyes: Negative.    Respiratory: Negative.     Cardiovascular: Negative.    Gastrointestinal: Negative.    Endocrine: Negative.    Genitourinary: Negative.    Musculoskeletal: Negative.    Skin: Negative.    Allergic/Immunologic: Negative.    Neurological: Negative.    Hematological: Negative.    Psychiatric/Behavioral: Negative.     All other systems reviewed and are negative.      Medications:     Current Outpatient Medications:     pantoprazole (PROTONIX) 40 MG EC tablet, Take 1 tablet by mouth 2 (Two) Times a Day Before Meals., Disp: 120 tablet, Rfl: 0    Allergies:   No Known Allergies    Social History:   Social History     Socioeconomic History    Marital status:    Tobacco Use    Smoking status: Never    Smokeless tobacco: Never   Vaping Use    Vaping status: Never Used   Substance and Sexual Activity    Alcohol use: No    Drug use: No    Sexual  activity: Defer        Surgical History:   Past Surgical History:   Procedure Laterality Date    APPENDECTOMY N/A 8/11/2020    Procedure: APPENDECTOMY LAPAROSCOPIC;  Surgeon: Renata Conway MD;  Location: [facility] OR;  Service: General;  Laterality: N/A;    ENDOSCOPY WITH FOREIGN BODY REMOVAL N/A 2/24/2025    Procedure: ESOPHAGOGASTRODUODENOSCOPY WITH FOREIGN BODY REMOVAL;  Surgeon: Santhosh Hernandez MD;  Location: [facility] ENDOSCOPY;  Service: Gastroenterology;  Laterality: N/A;        Medical History:   Past Medical History:   Diagnosis Date    Acute appendicitis 11/27/2019    COVID-19 positive 12/3/21 12/14/2021        Objective     Physical Exam:  Vital Signs: There were no vitals filed for this visit.  There is no height or weight on file to calculate BMI.     Physical Exam  Vitals and nursing note reviewed.   Constitutional:       General: He is not in acute distress.     Appearance: Normal appearance. He is normal weight. He is not ill-appearing or toxic-appearing.   Eyes:      General: No scleral icterus.     Conjunctiva/sclera: Conjunctivae normal.   Cardiovascular:      Comments: No chest pain.  Extremities appear well-perfused.  Pulmonary:      Effort: Pulmonary effort is normal.      Comments: Equal bilateral chest expansion appreciated.  No appreciable adventitious breath sounds noted.  No central cyanosis appreciated.  Abdominal:      General: Abdomen is flat. There is no distension.      Palpations: Abdomen is soft.      Tenderness: There is no abdominal tenderness.   Skin:     General: Skin is warm and dry.      Capillary Refill: Capillary refill takes less than 2 seconds.      Coloration: Skin is not jaundiced or pale.   Neurological:      General: No focal deficit present.      Mental Status: He is alert and oriented to person, place, and time.   Psychiatric:         Mood and Affect: Mood normal.         Behavior: Behavior normal.         Thought Content: Thought content normal.         Judgment:  Judgment normal.         Upper GI Endoscopy (02/24/2025 07:41)   EGD per Dr. Hernandez  Food found in the lower third of the esophagus that was successfully removed.  Esophageal mucosal changes concerning for eosinophilic esophagitis noted.  Dilation was performed.  Biopsies obtained.  Stomach and duodenum appeared normal.     02/24/25 08:30   Tissue Pathology Exam 1.  DISTAL ESOPHAGUS, BIOPSY:  Squamous mucosa with increased intraepithelial eosinophils (>50 per high-power field)  Negative for dysplasia or carcinoma     2.  PROXIMAL ESOPHAGUS, BIOPSY:  Squamous mucosa with increased intraepithelial eosinophils (up to 42 per high-power field)  Negative for dysplasia or carcinoma   Rpt: View report in Results Review for more information      Assessment / Plan      Assessment/Plan:   1.  Eosinophilic esophagitis  2.  GERD  3.  Dysphagia, odynophagia, related to #1    Chetan Gandhi is a 35-year-old male who presents for follow-up regarding eosinophilic esophagitis.  EGD and pathology results reviewed from hospital; pathology findings consistent with EOE.  Patient has been on high-dose PPI for the last 9 weeks; will send patient for repeat EGD with esophageal biopsies to assess response to PPI therapy.  If patient has failed to respond to PPI therapy, will consider neck step therapy i.e. Dupixent versus initiation of topical glucocorticoid.    >> Recommend outpatient EGD with repeat esophageal biopsies  >> Continue twice daily PPI until pathology results have been rendered; if having adequate response, will work to decrease dose of PPI to lowest effective.  >> If biopsies continue to show persistently elevated eosinophil count, we will need to consider additional therapy i.e. steroids versus initiation of Dupixent.  >> Recommend dietary modifications for GERD and dyspepsia    Follow Up:   Return to clinic following completion of EGD; 4 to 6 weeks.    Plan of care reviewed with the patient at the conclusion of  today's visit.  Education was provided regarding diagnosis, management, and any prescribed or recommended OTC medications.  Patient verbalized understanding of and agreement with management plan.     Time Statement:   Discussed plan of care in detail with patient today. Patient verbally understands and agrees. I have spent 20 minutes reviewing available diagnostics, obtaining history, examining the patient, developing a treatment plan, and educating the patient on disease process and plan of care.     Last White DNP, APRN, AGACNP-BC.  [facility]  05/01/2025  08:50 EDT

## 2025-05-01 ENCOUNTER — TELEMEDICINE (OUTPATIENT)
Dept: GASTROENTEROLOGY | Facility: CLINIC | Age: 35
End: 2025-05-01
Payer: COMMERCIAL

## 2025-05-01 DIAGNOSIS — K20.0 ESOPHAGITIS, EOSINOPHILIC: ICD-10-CM

## 2025-05-01 DIAGNOSIS — R13.19 ESOPHAGEAL DYSPHAGIA: Primary | ICD-10-CM

## 2025-05-01 RX ORDER — PANTOPRAZOLE SODIUM 40 MG/1
40 TABLET, DELAYED RELEASE ORAL
Qty: 180 TABLET | Refills: 3 | Status: SHIPPED | OUTPATIENT
Start: 2025-05-01 | End: 2026-05-01

## (undated) DEVICE — MEDI-VAC YANKAUER SUCTION HANDLE: Brand: CARDINAL HEALTH

## (undated) DEVICE — ENDOCUT SCISSOR TIP, DISPOSABLE: Brand: RENEW

## (undated) DEVICE — SPNG VERSALON 4X4 4PLY NONSTRL LF BG/200

## (undated) DEVICE — SUT SILK 2/0 PS 18IN 1588H

## (undated) DEVICE — ENDOPATH XCEL UNIVERSAL TROCAR STABLILITY SLEEVES: Brand: ENDOPATH XCEL

## (undated) DEVICE — ESOPHAGEAL/PYLORIC/COLONIC WIREGUIDED BALLOON DILATATION CATHETER: Brand: CRE WIREGUIDED

## (undated) DEVICE — THE ECHELON FLEX POWERED PLUS ARTICULATING ENDOSCOPIC LINEAR CUTTERS ARE STERILE, SINGLE PATIENT USE INSTRUMENTS THAT SIMULTANEOUSLYCUT AND STAPLE TISSUE. THERE ARE SIX STAGGERED ROWS OF STAPLES, THREE ON EITHER SIDE OF THE CUT LINE. THE ECHELON FLEX 45 POWERED PLUSINSTRUMENTS HAVE A STAPLE LINE THAT IS APPROXIMATELY 45 MM LONG AND A CUT LINE THAT IS APPROXIMATELY 42 MM LONG. THE SHAFT CAN ROTATE FREELYIN BOTH DIRECTIONS AND AN ARTICULATION MECHANISM ENABLES THE DISTAL PORTION OF THE SHAFT TO PIVOT TO FACILITATE LATERAL ACCESS TO THE OPERATIVESITE.THE INSTRUMENTS ARE PACKAGED WITH A PRIMARY LITHIUM BATTERY PACK THAT MUST BE INSTALLED PRIOR TO USE. THERE ARE SPECIFIC REQUIREMENTS FORDISPOSING OF THE BATTERY PACK. REFER TO THE BATTERY PACK DISPOSAL SECTION.THE INSTRUMENTS ARE PACKAGED WITHOUT A RELOAD AND MUST BE LOADED PRIOR TO USE. A STAPLE RETAINING CAP ON THE RELOAD PROTECTS THE STAPLE LEGPOINTS DURING SHIPPING AND TRANSPORTATION. THE INSTRUMENTS’ LOCK-OUT FEATURE IS DESIGNED TO PREVENT A USED OR IMPROPERLY INSTALLED RELOADFROM BEING REFIRED OR AN INSTRUMENT FROM BEING FIRED WITHOUT A RELOAD.: Brand: ECHELON FLEX

## (undated) DEVICE — ENDOPATH XCEL BLADELESS TROCARS WITH STABILITY SLEEVES: Brand: ENDOPATH XCEL

## (undated) DEVICE — THE DISPOSABLE ROTH NET FOREIGN BODY STANDARD RETRIEVAL DEVICE IS USED IN THE ENDOSCOPIC RETRIEVAL OF FOREIGN BODY, FOOD BOLUS AND EXCISED TISSUE SUCH AS POLYPS.: Brand: ROTH NET

## (undated) DEVICE — SYR LUERLOK 50ML

## (undated) DEVICE — THE BITE BLOCK MAXI, LATEX FREE STRAP IS USED TO PROTECT THE ENDOSCOPE INSERTION TUBE FROM BEING BITTEN BY THE PATIENT.

## (undated) DEVICE — ANTIBACTERIAL UNDYED BRAIDED (POLYGLACTIN 910), SYNTHETIC ABSORBABLE SURGICAL SUTURE: Brand: COATED VICRYL

## (undated) DEVICE — GLV SURG SENSICARE PI ORTHO SZ7.5 LF STRL

## (undated) DEVICE — SUT SILK 2/0 SH 30IN K833H

## (undated) DEVICE — CONTN GRAD MEAS TRIANG 32OZ BLK

## (undated) DEVICE — CVR HNDL LIGHT RIGID

## (undated) DEVICE — JACKSON-PRATT 100CC BULB RESERVOIR: Brand: CARDINAL HEALTH

## (undated) DEVICE — PK LAP LASR CHOLE 10

## (undated) DEVICE — DRAIN JACKSON PRATT ROUND 15FR: Brand: CARDINAL HEALTH

## (undated) DEVICE — [HIGH FLOW INSUFFLATOR,  DO NOT USE IF PACKAGE IS DAMAGED,  KEEP DRY,  KEEP AWAY FROM SUNLIGHT,  PROTECT FROM HEAT AND RADIOACTIVE SOURCES.]: Brand: PNEUMOSURE

## (undated) DEVICE — DEV INFL CRE STERIFLATE 60CC DISP

## (undated) DEVICE — ENDOPATH XCEL BLUNT TIP TROCARS WITH SMOOTH SLEEVES: Brand: ENDOPATH XCEL

## (undated) DEVICE — HYBRID CO2 TUBING/CAP SET FOR OLYMPUS® SCOPES & CO2 SOURCE: Brand: ERBE

## (undated) DEVICE — INTRO ACCSR BLNT TP

## (undated) DEVICE — SUT MNCRYL PLS ANTIB UD 4/0 PS2 18IN

## (undated) DEVICE — ENDOPOUCH RETRIEVER SPECIMEN RETRIEVAL BAGS: Brand: ENDOPOUCH RETRIEVER

## (undated) DEVICE — KT ORCA ORCAPOD DISP STRL

## (undated) DEVICE — INTENDED FOR TISSUE SEPARATION, AND OTHER PROCEDURES THAT REQUIRE A SHARP SURGICAL BLADE TO PUNCTURE OR CUT.: Brand: BARD-PARKER ® STAINLESS STEEL BLADES

## (undated) DEVICE — TUBING, SUCTION, 1/4" X 10', STRAIGHT: Brand: MEDLINE